# Patient Record
Sex: MALE | Race: WHITE | NOT HISPANIC OR LATINO | Employment: UNEMPLOYED | URBAN - NONMETROPOLITAN AREA
[De-identification: names, ages, dates, MRNs, and addresses within clinical notes are randomized per-mention and may not be internally consistent; named-entity substitution may affect disease eponyms.]

---

## 2020-02-10 ENCOUNTER — APPOINTMENT (OUTPATIENT)
Dept: GENERAL RADIOLOGY | Facility: HOSPITAL | Age: 45
End: 2020-02-10

## 2020-02-10 ENCOUNTER — HOSPITAL ENCOUNTER (EMERGENCY)
Facility: HOSPITAL | Age: 45
Discharge: HOME | End: 2020-02-10
Attending: EMERGENCY MEDICINE | Admitting: EMERGENCY MEDICINE

## 2020-02-10 VITALS
DIASTOLIC BLOOD PRESSURE: 87 MMHG | HEIGHT: 68 IN | WEIGHT: 176 LBS | SYSTOLIC BLOOD PRESSURE: 135 MMHG | TEMPERATURE: 98.4 F | HEART RATE: 79 BPM | OXYGEN SATURATION: 97 % | BODY MASS INDEX: 26.67 KG/M2 | RESPIRATION RATE: 18 BRPM

## 2020-02-10 DIAGNOSIS — Z90.81 HISTORY OF SPLENECTOMY: ICD-10-CM

## 2020-02-10 DIAGNOSIS — R07.9 CHEST PAIN, UNSPECIFIED TYPE: ICD-10-CM

## 2020-02-10 DIAGNOSIS — H66.90 ACUTE OTITIS MEDIA, UNSPECIFIED OTITIS MEDIA TYPE: Primary | ICD-10-CM

## 2020-02-10 LAB
ALBUMIN SERPL-MCNC: 3.6 G/DL (ref 3.5–5.2)
ALBUMIN/GLOB SERPL: 1.1 G/DL
ALP SERPL-CCNC: 70 U/L (ref 39–117)
ALT SERPL W P-5'-P-CCNC: 25 U/L (ref 1–41)
ANION GAP SERPL CALCULATED.3IONS-SCNC: 13.2 MMOL/L (ref 5–15)
AST SERPL-CCNC: 15 U/L (ref 1–40)
BASOPHILS # BLD AUTO: 0.41 10*3/MM3 (ref 0–0.2)
BASOPHILS NFR BLD AUTO: 2.6 % (ref 0–1.5)
BILIRUB SERPL-MCNC: 0.4 MG/DL (ref 0.2–1.2)
BUN BLD-MCNC: 14 MG/DL (ref 6–20)
BUN/CREAT SERPL: 14.9 (ref 7–25)
CALCIUM SPEC-SCNC: 9.3 MG/DL (ref 8.6–10.5)
CHLORIDE SERPL-SCNC: 111 MMOL/L (ref 98–107)
CO2 SERPL-SCNC: 22.8 MMOL/L (ref 22–29)
CREAT BLD-MCNC: 0.94 MG/DL (ref 0.76–1.27)
DEPRECATED RDW RBC AUTO: 48.9 FL (ref 37–54)
EOSINOPHIL # BLD AUTO: 0.53 10*3/MM3 (ref 0–0.4)
EOSINOPHIL NFR BLD AUTO: 3.4 % (ref 0.3–6.2)
ERYTHROCYTE [DISTWIDTH] IN BLOOD BY AUTOMATED COUNT: 14.7 % (ref 12.3–15.4)
FLUAV AG NPH QL: NEGATIVE
FLUBV AG NPH QL IA: NEGATIVE
GFR SERPL CREATININE-BSD FRML MDRD: 87 ML/MIN/1.73
GLOBULIN UR ELPH-MCNC: 3.2 GM/DL
GLUCOSE BLD-MCNC: 166 MG/DL (ref 65–99)
HCT VFR BLD AUTO: 44.2 % (ref 37.5–51)
HGB BLD-MCNC: 15.1 G/DL (ref 13–17.7)
HOLD SPECIMEN: NORMAL
HOLD SPECIMEN: NORMAL
IMM GRANULOCYTES # BLD AUTO: 0.06 10*3/MM3 (ref 0–0.05)
IMM GRANULOCYTES NFR BLD AUTO: 0.4 % (ref 0–0.5)
LYMPHOCYTES # BLD AUTO: 2.91 10*3/MM3 (ref 0.7–3.1)
LYMPHOCYTES NFR BLD AUTO: 18.5 % (ref 19.6–45.3)
MCH RBC QN AUTO: 30.8 PG (ref 26.6–33)
MCHC RBC AUTO-ENTMCNC: 34.2 G/DL (ref 31.5–35.7)
MCV RBC AUTO: 90.2 FL (ref 79–97)
MONOCYTES # BLD AUTO: 1.75 10*3/MM3 (ref 0.1–0.9)
MONOCYTES NFR BLD AUTO: 11.1 % (ref 5–12)
NEUTROPHILS # BLD AUTO: 10.1 10*3/MM3 (ref 1.7–7)
NEUTROPHILS NFR BLD AUTO: 64 % (ref 42.7–76)
NRBC BLD AUTO-RTO: 0 /100 WBC (ref 0–0.2)
PLATELET # BLD AUTO: 532 10*3/MM3 (ref 140–450)
PMV BLD AUTO: 9 FL (ref 6–12)
POTASSIUM BLD-SCNC: 3.9 MMOL/L (ref 3.5–5.2)
POTASSIUM BLD-SCNC: 5.8 MMOL/L (ref 3.5–5.2)
PROT SERPL-MCNC: 6.8 G/DL (ref 6–8.5)
RBC # BLD AUTO: 4.9 10*6/MM3 (ref 4.14–5.8)
SODIUM BLD-SCNC: 147 MMOL/L (ref 136–145)
TROPONIN I SERPL-MCNC: 0 NG/ML (ref 0–0.05)
TROPONIN T SERPL-MCNC: <0.01 NG/ML (ref 0–0.03)
TROPONIN T SERPL-MCNC: <0.01 NG/ML (ref 0–0.03)
WBC NRBC COR # BLD: 15.76 10*3/MM3 (ref 3.4–10.8)
WHOLE BLOOD HOLD SPECIMEN: NORMAL
WHOLE BLOOD HOLD SPECIMEN: NORMAL

## 2020-02-10 PROCEDURE — 80053 COMPREHEN METABOLIC PANEL: CPT | Performed by: EMERGENCY MEDICINE

## 2020-02-10 PROCEDURE — 99283 EMERGENCY DEPT VISIT LOW MDM: CPT

## 2020-02-10 PROCEDURE — 93005 ELECTROCARDIOGRAM TRACING: CPT | Performed by: EMERGENCY MEDICINE

## 2020-02-10 PROCEDURE — 84484 ASSAY OF TROPONIN QUANT: CPT | Performed by: EMERGENCY MEDICINE

## 2020-02-10 PROCEDURE — 85025 COMPLETE CBC W/AUTO DIFF WBC: CPT | Performed by: EMERGENCY MEDICINE

## 2020-02-10 PROCEDURE — 84132 ASSAY OF SERUM POTASSIUM: CPT | Performed by: EMERGENCY MEDICINE

## 2020-02-10 PROCEDURE — 71045 X-RAY EXAM CHEST 1 VIEW: CPT

## 2020-02-10 PROCEDURE — 87804 INFLUENZA ASSAY W/OPTIC: CPT | Performed by: EMERGENCY MEDICINE

## 2020-02-10 RX ORDER — ASPIRIN 325 MG
325 TABLET ORAL ONCE
Status: COMPLETED | OUTPATIENT
Start: 2020-02-10 | End: 2020-02-10

## 2020-02-10 RX ORDER — SODIUM CHLORIDE 0.9 % (FLUSH) 0.9 %
10 SYRINGE (ML) INJECTION AS NEEDED
Status: DISCONTINUED | OUTPATIENT
Start: 2020-02-10 | End: 2020-02-10 | Stop reason: HOSPADM

## 2020-02-10 RX ORDER — AMOXICILLIN 500 MG/1
500 CAPSULE ORAL ONCE
Status: COMPLETED | OUTPATIENT
Start: 2020-02-10 | End: 2020-02-10

## 2020-02-10 RX ORDER — AMOXICILLIN 875 MG/1
875 TABLET, COATED ORAL 2 TIMES DAILY
Qty: 14 TABLET | Refills: 0 | Status: ON HOLD | OUTPATIENT
Start: 2020-02-10 | End: 2022-05-02

## 2020-02-10 RX ADMIN — AMOXICILLIN 500 MG: 500 CAPSULE ORAL at 10:45

## 2020-02-10 RX ADMIN — ASPIRIN 325 MG ORAL TABLET 325 MG: 325 PILL ORAL at 07:52

## 2020-02-10 NOTE — ED PROVIDER NOTES
Subjective   History of Present Illness    Chief Complaint: Chest pain earache sinus congestion  History of Present Illness: Upper respiratory symptoms for the past several days.  Reported right upper chest pain today.  Dry cough  Onset: Approximately 5 days of upper respiratory symptoms.  Chest pain today  Duration: Persistent  Exacerbating / Alleviating factors: None  Associated symptoms: None  Character: 45-year-old male states he is homeless, history of splenectomy.  Is concerned that he needs antibiotics because he cannot usually fight off infection    Nurses Notes reviewed and agree, including vitals, allergies, social history and prior medical history.     REVIEW OF SYSTEMS: All systems reviewed and not pertinent unless noted.    Positive for: Chest pain upper respiratory symptoms sinus congestion earache    Negative for: Fever, hemoptysis, syncope or rash  Review of Systems    Past Medical History:   Diagnosis Date   • Injury of back        No Known Allergies    Past Surgical History:   Procedure Laterality Date   • ANKLE SURGERY Bilateral    • BACK SURGERY     • SPLENECTOMY         History reviewed. No pertinent family history.    Social History     Socioeconomic History   • Marital status: Single     Spouse name: Not on file   • Number of children: Not on file   • Years of education: Not on file   • Highest education level: Not on file   Tobacco Use   • Smoking status: Current Every Day Smoker     Packs/day: 0.50     Types: Cigarettes   • Smokeless tobacco: Current User     Types: Snuff, Chew   Substance and Sexual Activity   • Alcohol use: Never     Frequency: Never   • Drug use: Not Currently     Types: Methamphetamines     Comment: quit x 3wks   • Sexual activity: Defer           Objective   Physical Exam      GENERAL APPEARANCE: Well developed, well nourished, in no acute distress.  VITAL SIGNS: per nursing, reviewed and noted  SKIN: no rashes, ulcerations or petechiae.  Head: Normocephalic,  atraumatic.   EYES: perrla. EOMI.  ENT: Right TM clear, posterior pharynx patent.  Left TM with bulging dullness and erythema.  Boggy turbinates  LUNGS:  normal breath sounds. No retractions.   CARDIOVASCULAR:  regular rate and rhythm, no murmurs.  Good Peripheral pulses.  ABDOMEN: Soft, nontender, normal bowel sounds. No hernia. No ascites.  MUSCULOSKELETAL:  No tenderness. Full ROM. Strength and tone normal.  NEUROLOGIC: Alert, oriented x 3. No gross deficits.   NECK: Supple, symmetric. No tenderness, no masses. Full ROM  Back: full rom, no paraspinal spasm. No CVA tenderness.   PSYCH: appropriate affect,  : no bladder tenderness or distention, no CVA tenderness      Procedures     No attending provider procedures were performed      ED Course  ED Course as of Feb 10 1159   Mon Feb 10, 2020   0828 EKG interpreted by me reveals sinus rhythm at a rate of 98.  And no ectopy no ischemic changes.    [PF]   1004 Troponin T: <0.010 [PF]   1004 Potassium: 3.9 [PF]   1004 Influenza A Ag, EIA: Negative [PF]   1004 Influenza B Ag, EIA: Negative [PF]   1004 Glucose(!): 166 [PF]   1004 BUN: 14 [PF]   1004 Creatinine: 0.94 [PF]   1004 Sodium(!): 147 [PF]   1004 Chloride(!): 111 [PF]   1004 CO2: 22.8 [PF]   1004 Calcium: 9.3 [PF]   1004 Albumin: 3.60 [PF]   1004 ALT (SGPT): 25 [PF]   1004 AST (SGOT): 15 [PF]   1004 Alkaline Phosphatase: 70 [PF]   1004 Total Bilirubin: 0.4 [PF]   1004 Troponin I: 0.000 [PF]   1004 Hemoglobin: 15.1 [PF]   1004 Hematocrit: 44.2 [PF]   1004 Platelets(!): 532 [PF]   1005 FINDINGS: The heart is normal in size. The mediastinum is unremarkable.  The lungs are clear. There is no pneumothorax.  There are no acute  osseous abnormalities.     IMPRESSION:  No acute cardiopulmonary process.    [PF]      ED Course User Index  [PF] Devonte Dumas W, DO                                               MDM    Reassuring cardiac work-up with troponins negative x2.  No evidence of ischemic changes on EKG.  Chest x-ray  without acute findings..  Discharged home and stable condition with outpatient follow-up recommendations and return precautions discussed.  We will add amoxicillin for otitis media.    Final diagnoses:   Acute otitis media, unspecified otitis media type   History of splenectomy   Chest pain, unspecified type            Devonte Dumas,   02/10/20 1158

## 2020-02-10 NOTE — ED NOTES
At this time, case management was contacted and left a voicemail per JANETT Hernández.      Chloe Santamaria  02/10/20 1037

## 2020-02-10 NOTE — ED NOTES
At this time, House supervisor was contacted and she states she will reached out to case management.      Chloe Santamaria  02/10/20 1037

## 2020-02-10 NOTE — ED NOTES
At this time, Case management was contacted and left a voicemail per JANETT Hernández.      Clhoe Santamaria  02/10/20 1013

## 2022-05-01 ENCOUNTER — HOSPITAL ENCOUNTER (EMERGENCY)
Facility: HOSPITAL | Age: 47
Discharge: PSYCHIATRIC OR SUBSTANCE USE FACILITY | End: 2022-05-02
Attending: FAMILY MEDICINE | Admitting: FAMILY MEDICINE

## 2022-05-01 DIAGNOSIS — R45.851 SUICIDAL IDEATIONS: ICD-10-CM

## 2022-05-01 DIAGNOSIS — F19.10 SUBSTANCE ABUSE: Primary | ICD-10-CM

## 2022-05-01 LAB
ALBUMIN SERPL-MCNC: 4.7 G/DL (ref 3.5–5.2)
ALBUMIN/GLOB SERPL: 1.3 G/DL
ALP SERPL-CCNC: 107 U/L (ref 39–117)
ALT SERPL W P-5'-P-CCNC: 38 U/L (ref 1–41)
AMPHET+METHAMPHET UR QL: POSITIVE
AMPHETAMINES UR QL: POSITIVE
ANION GAP SERPL CALCULATED.3IONS-SCNC: 15.3 MMOL/L (ref 5–15)
APAP SERPL-MCNC: <5 MCG/ML (ref 0–30)
AST SERPL-CCNC: 28 U/L (ref 1–40)
BACTERIA UR QL AUTO: ABNORMAL /HPF
BARBITURATES UR QL SCN: NEGATIVE
BASOPHILS # BLD AUTO: 0.28 10*3/MM3 (ref 0–0.2)
BASOPHILS NFR BLD AUTO: 1.5 % (ref 0–1.5)
BENZODIAZ UR QL SCN: NEGATIVE
BILIRUB SERPL-MCNC: 0.7 MG/DL (ref 0–1.2)
BILIRUB UR QL STRIP: NEGATIVE
BUN SERPL-MCNC: 23 MG/DL (ref 6–20)
BUN/CREAT SERPL: 21.3 (ref 7–25)
BUPRENORPHINE SERPL-MCNC: NEGATIVE NG/ML
CALCIUM SPEC-SCNC: 9.9 MG/DL (ref 8.6–10.5)
CANNABINOIDS SERPL QL: POSITIVE
CHLORIDE SERPL-SCNC: 100 MMOL/L (ref 98–107)
CLARITY UR: CLEAR
CO2 SERPL-SCNC: 22.7 MMOL/L (ref 22–29)
COCAINE UR QL: NEGATIVE
COLOR UR: YELLOW
CREAT SERPL-MCNC: 1.08 MG/DL (ref 0.76–1.27)
DEPRECATED RDW RBC AUTO: 47.6 FL (ref 37–54)
EGFRCR SERPLBLD CKD-EPI 2021: 85.2 ML/MIN/1.73
EOSINOPHIL # BLD AUTO: 0.03 10*3/MM3 (ref 0–0.4)
EOSINOPHIL NFR BLD AUTO: 0.2 % (ref 0.3–6.2)
ERYTHROCYTE [DISTWIDTH] IN BLOOD BY AUTOMATED COUNT: 14.3 % (ref 12.3–15.4)
ETHANOL BLD-MCNC: <10 MG/DL (ref 0–10)
ETHANOL UR QL: <0.01 %
GLOBULIN UR ELPH-MCNC: 3.6 GM/DL
GLUCOSE SERPL-MCNC: 99 MG/DL (ref 65–99)
GLUCOSE UR STRIP-MCNC: NEGATIVE MG/DL
HCT VFR BLD AUTO: 44.3 % (ref 37.5–51)
HGB BLD-MCNC: 15.4 G/DL (ref 13–17.7)
HGB UR QL STRIP.AUTO: NEGATIVE
HOLD SPECIMEN: NORMAL
HOLD SPECIMEN: NORMAL
HYALINE CASTS UR QL AUTO: ABNORMAL /LPF
IMM GRANULOCYTES # BLD AUTO: 0.07 10*3/MM3 (ref 0–0.05)
IMM GRANULOCYTES NFR BLD AUTO: 0.4 % (ref 0–0.5)
KETONES UR QL STRIP: ABNORMAL
LEUKOCYTE ESTERASE UR QL STRIP.AUTO: NEGATIVE
LYMPHOCYTES # BLD AUTO: 2.83 10*3/MM3 (ref 0.7–3.1)
LYMPHOCYTES NFR BLD AUTO: 15.6 % (ref 19.6–45.3)
MAGNESIUM SERPL-MCNC: 2.3 MG/DL (ref 1.6–2.6)
MCH RBC QN AUTO: 31.6 PG (ref 26.6–33)
MCHC RBC AUTO-ENTMCNC: 34.8 G/DL (ref 31.5–35.7)
MCV RBC AUTO: 90.8 FL (ref 79–97)
METHADONE UR QL SCN: NEGATIVE
MONOCYTES # BLD AUTO: 1.78 10*3/MM3 (ref 0.1–0.9)
MONOCYTES NFR BLD AUTO: 9.8 % (ref 5–12)
MUCOUS THREADS URNS QL MICRO: ABNORMAL /HPF
NEUTROPHILS NFR BLD AUTO: 13.18 10*3/MM3 (ref 1.7–7)
NEUTROPHILS NFR BLD AUTO: 72.5 % (ref 42.7–76)
NITRITE UR QL STRIP: NEGATIVE
NRBC BLD AUTO-RTO: 0 /100 WBC (ref 0–0.2)
OPIATES UR QL: POSITIVE
OXYCODONE UR QL SCN: NEGATIVE
PCP UR QL SCN: NEGATIVE
PH UR STRIP.AUTO: <=5 [PH] (ref 5–8)
PLATELET # BLD AUTO: 617 10*3/MM3 (ref 140–450)
PMV BLD AUTO: 9.4 FL (ref 6–12)
POTASSIUM SERPL-SCNC: 4.1 MMOL/L (ref 3.5–5.2)
PROPOXYPH UR QL: NEGATIVE
PROT SERPL-MCNC: 8.3 G/DL (ref 6–8.5)
PROT UR QL STRIP: ABNORMAL
RBC # BLD AUTO: 4.88 10*6/MM3 (ref 4.14–5.8)
RBC # UR STRIP: ABNORMAL /HPF
REF LAB TEST METHOD: ABNORMAL
SALICYLATES SERPL-MCNC: <0.3 MG/DL
SARS-COV-2 RNA PNL SPEC NAA+PROBE: NOT DETECTED
SODIUM SERPL-SCNC: 138 MMOL/L (ref 136–145)
SP GR UR STRIP: >=1.03 (ref 1–1.03)
SPERM URNS QL MICRO: ABNORMAL /HPF
SQUAMOUS #/AREA URNS HPF: ABNORMAL /HPF
TRICYCLICS UR QL SCN: NEGATIVE
UROBILINOGEN UR QL STRIP: ABNORMAL
WBC # UR STRIP: ABNORMAL /HPF
WBC NRBC COR # BLD: 18.17 10*3/MM3 (ref 3.4–10.8)
WHOLE BLOOD HOLD SPECIMEN: NORMAL
WHOLE BLOOD HOLD SPECIMEN: NORMAL

## 2022-05-01 PROCEDURE — 85025 COMPLETE CBC W/AUTO DIFF WBC: CPT | Performed by: PHYSICIAN ASSISTANT

## 2022-05-01 PROCEDURE — 81001 URINALYSIS AUTO W/SCOPE: CPT | Performed by: PHYSICIAN ASSISTANT

## 2022-05-01 PROCEDURE — 82077 ASSAY SPEC XCP UR&BREATH IA: CPT | Performed by: PHYSICIAN ASSISTANT

## 2022-05-01 PROCEDURE — 80143 DRUG ASSAY ACETAMINOPHEN: CPT | Performed by: PHYSICIAN ASSISTANT

## 2022-05-01 PROCEDURE — 80053 COMPREHEN METABOLIC PANEL: CPT | Performed by: PHYSICIAN ASSISTANT

## 2022-05-01 PROCEDURE — 36415 COLL VENOUS BLD VENIPUNCTURE: CPT

## 2022-05-01 PROCEDURE — 83735 ASSAY OF MAGNESIUM: CPT | Performed by: PHYSICIAN ASSISTANT

## 2022-05-01 PROCEDURE — C9803 HOPD COVID-19 SPEC COLLECT: HCPCS

## 2022-05-01 PROCEDURE — 93005 ELECTROCARDIOGRAM TRACING: CPT

## 2022-05-01 PROCEDURE — 80306 DRUG TEST PRSMV INSTRMNT: CPT | Performed by: PHYSICIAN ASSISTANT

## 2022-05-01 PROCEDURE — 99284 EMERGENCY DEPT VISIT MOD MDM: CPT

## 2022-05-01 PROCEDURE — 80179 DRUG ASSAY SALICYLATE: CPT | Performed by: PHYSICIAN ASSISTANT

## 2022-05-01 PROCEDURE — 87635 SARS-COV-2 COVID-19 AMP PRB: CPT | Performed by: PHYSICIAN ASSISTANT

## 2022-05-02 ENCOUNTER — HOSPITAL ENCOUNTER (INPATIENT)
Facility: HOSPITAL | Age: 47
LOS: 8 days | Discharge: HOME | End: 2022-05-10
Attending: PSYCHIATRY & NEUROLOGY | Admitting: PSYCHIATRY & NEUROLOGY

## 2022-05-02 VITALS
RESPIRATION RATE: 16 BRPM | TEMPERATURE: 98.1 F | OXYGEN SATURATION: 99 % | HEART RATE: 72 BPM | SYSTOLIC BLOOD PRESSURE: 110 MMHG | HEIGHT: 68 IN | DIASTOLIC BLOOD PRESSURE: 82 MMHG | WEIGHT: 165 LBS | BODY MASS INDEX: 25.01 KG/M2

## 2022-05-02 PROBLEM — R45.851 SUICIDAL IDEATION: Status: ACTIVE | Noted: 2022-05-02

## 2022-05-02 PROCEDURE — 93010 ELECTROCARDIOGRAM REPORT: CPT | Performed by: INTERNAL MEDICINE

## 2022-05-02 PROCEDURE — 93005 ELECTROCARDIOGRAM TRACING: CPT | Performed by: PSYCHIATRY & NEUROLOGY

## 2022-05-02 PROCEDURE — 99223 1ST HOSP IP/OBS HIGH 75: CPT | Performed by: PSYCHIATRY & NEUROLOGY

## 2022-05-02 RX ORDER — DICYCLOMINE HYDROCHLORIDE 10 MG/1
10 CAPSULE ORAL 3 TIMES DAILY PRN
Status: DISPENSED | OUTPATIENT
Start: 2022-05-02 | End: 2022-05-07

## 2022-05-02 RX ORDER — ALUMINA, MAGNESIA, AND SIMETHICONE 2400; 2400; 240 MG/30ML; MG/30ML; MG/30ML
15 SUSPENSION ORAL EVERY 6 HOURS PRN
Status: DISCONTINUED | OUTPATIENT
Start: 2022-05-02 | End: 2022-05-10 | Stop reason: HOSPADM

## 2022-05-02 RX ORDER — ONDANSETRON 4 MG/1
4 TABLET, FILM COATED ORAL EVERY 6 HOURS PRN
Status: DISCONTINUED | OUTPATIENT
Start: 2022-05-02 | End: 2022-05-10 | Stop reason: HOSPADM

## 2022-05-02 RX ORDER — CLONIDINE HYDROCHLORIDE 0.1 MG/1
0.1 TABLET ORAL 4 TIMES DAILY PRN
Status: ACTIVE | OUTPATIENT
Start: 2022-05-03 | End: 2022-05-04

## 2022-05-02 RX ORDER — IBUPROFEN 400 MG/1
400 TABLET ORAL EVERY 6 HOURS PRN
Status: DISCONTINUED | OUTPATIENT
Start: 2022-05-02 | End: 2022-05-10 | Stop reason: HOSPADM

## 2022-05-02 RX ORDER — HYDROXYZINE 50 MG/1
50 TABLET, FILM COATED ORAL EVERY 6 HOURS PRN
Status: DISCONTINUED | OUTPATIENT
Start: 2022-05-02 | End: 2022-05-10 | Stop reason: HOSPADM

## 2022-05-02 RX ORDER — ACETAMINOPHEN 325 MG/1
650 TABLET ORAL EVERY 6 HOURS PRN
Status: DISCONTINUED | OUTPATIENT
Start: 2022-05-02 | End: 2022-05-10 | Stop reason: HOSPADM

## 2022-05-02 RX ORDER — NITROFURANTOIN 25; 75 MG/1; MG/1
100 CAPSULE ORAL 2 TIMES DAILY
Status: DISPENSED | OUTPATIENT
Start: 2022-05-02 | End: 2022-05-09

## 2022-05-02 RX ORDER — FAMOTIDINE 20 MG/1
20 TABLET, FILM COATED ORAL 2 TIMES DAILY PRN
Status: DISCONTINUED | OUTPATIENT
Start: 2022-05-02 | End: 2022-05-10 | Stop reason: HOSPADM

## 2022-05-02 RX ORDER — BENZONATATE 100 MG/1
100 CAPSULE ORAL 3 TIMES DAILY PRN
Status: DISCONTINUED | OUTPATIENT
Start: 2022-05-02 | End: 2022-05-10 | Stop reason: HOSPADM

## 2022-05-02 RX ORDER — CYCLOBENZAPRINE HCL 10 MG
10 TABLET ORAL 3 TIMES DAILY PRN
Status: DISPENSED | OUTPATIENT
Start: 2022-05-02 | End: 2022-05-07

## 2022-05-02 RX ORDER — BENZTROPINE MESYLATE 1 MG/ML
1 INJECTION INTRAMUSCULAR; INTRAVENOUS ONCE AS NEEDED
Status: DISCONTINUED | OUTPATIENT
Start: 2022-05-02 | End: 2022-05-10 | Stop reason: HOSPADM

## 2022-05-02 RX ORDER — NICOTINE 21 MG/24HR
1 PATCH, TRANSDERMAL 24 HOURS TRANSDERMAL
Status: DISCONTINUED | OUTPATIENT
Start: 2022-05-02 | End: 2022-05-10 | Stop reason: HOSPADM

## 2022-05-02 RX ORDER — BENZTROPINE MESYLATE 1 MG/1
2 TABLET ORAL ONCE AS NEEDED
Status: DISCONTINUED | OUTPATIENT
Start: 2022-05-02 | End: 2022-05-10 | Stop reason: HOSPADM

## 2022-05-02 RX ORDER — ECHINACEA PURPUREA EXTRACT 125 MG
2 TABLET ORAL AS NEEDED
Status: DISCONTINUED | OUTPATIENT
Start: 2022-05-02 | End: 2022-05-10 | Stop reason: HOSPADM

## 2022-05-02 RX ORDER — LOPERAMIDE HYDROCHLORIDE 2 MG/1
2 CAPSULE ORAL
Status: DISCONTINUED | OUTPATIENT
Start: 2022-05-02 | End: 2022-05-10 | Stop reason: HOSPADM

## 2022-05-02 RX ORDER — ESCITALOPRAM OXALATE 10 MG/1
5 TABLET ORAL NIGHTLY
Status: DISCONTINUED | OUTPATIENT
Start: 2022-05-02 | End: 2022-05-04

## 2022-05-02 RX ORDER — CLONIDINE HYDROCHLORIDE 0.1 MG/1
0.1 TABLET ORAL DAILY PRN
Status: ACTIVE | OUTPATIENT
Start: 2022-05-06 | End: 2022-05-07

## 2022-05-02 RX ORDER — CLONIDINE HYDROCHLORIDE 0.1 MG/1
0.1 TABLET ORAL 4 TIMES DAILY PRN
Status: ACTIVE | OUTPATIENT
Start: 2022-05-02 | End: 2022-05-03

## 2022-05-02 RX ORDER — TRAZODONE HYDROCHLORIDE 50 MG/1
50 TABLET ORAL NIGHTLY PRN
Status: DISCONTINUED | OUTPATIENT
Start: 2022-05-02 | End: 2022-05-10 | Stop reason: HOSPADM

## 2022-05-02 RX ORDER — CLONIDINE HYDROCHLORIDE 0.1 MG/1
0.1 TABLET ORAL 3 TIMES DAILY PRN
Status: ACTIVE | OUTPATIENT
Start: 2022-05-04 | End: 2022-05-05

## 2022-05-02 RX ORDER — CLONIDINE HYDROCHLORIDE 0.1 MG/1
0.1 TABLET ORAL 2 TIMES DAILY PRN
Status: ACTIVE | OUTPATIENT
Start: 2022-05-05 | End: 2022-05-06

## 2022-05-02 RX ADMIN — CYCLOBENZAPRINE 10 MG: 10 TABLET, FILM COATED ORAL at 20:34

## 2022-05-02 RX ADMIN — ESCITALOPRAM 5 MG: 10 TABLET, FILM COATED ORAL at 20:34

## 2022-05-02 RX ADMIN — TRAZODONE HYDROCHLORIDE 50 MG: 50 TABLET ORAL at 20:34

## 2022-05-02 RX ADMIN — NITROFURANTOIN MONOHYDRATE/MACROCRYSTALLINE 100 MG: 25; 75 CAPSULE ORAL at 20:34
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
## 2022-05-02 NOTE — PLAN OF CARE
Problem: Adult Behavioral Health Plan of Care  Goal: Plan of Care Review  Outcome: Ongoing, Progressing  Flowsheets  Taken 5/2/2022 1359 by Jovanni Guerrero, RN  Progress: improving  Outcome Evaluation: PATIENTS MOOD IS LABILE, FREQUENTLY ANGRY AND PARANOID. PATIENT VERBALIZES SEVERE ANXIETY AND DEPRESSIONSI WITH NO PLAN THAT HE WILL STATE, AVH, NO CRAVING OR S/S OF WITHDRAWAL. NEW ORDERS : CBC, LEXAPRO 5MG.  Taken 5/2/2022 0951 by Clare Hill MSW  Plan of Care Reviewed With: patient  Patient Agreement with Plan of Care: agrees  Taken 5/2/2022 0746 by Jovanni Guerrero, RN  Plan of Care Reviewed With: patient  Patient Agreement with Plan of Care: agrees   Goal Outcome Evaluation:  Plan of Care Reviewed With: patient  Patient Agreement with Plan of Care: agrees     Progress: improving  Outcome Evaluation: PATIENTS MOOD IS LABILE, FREQUENTLY ANGRY AND PARANOID. PATIENT VERBALIZES SEVERE ANXIETY AND DEPRESSIONSI WITH NO PLAN THAT HE WILL STATE, AVH, NO CRAVING OR S/S OF WITHDRAWAL. NEW ORDERS : CBC, LEXAPRO 5MG.

## 2022-05-02 NOTE — H&P
INITIAL PSYCHIATRIC HISTORY & PHYSICAL    Patient Identification:  Name:  Gera Stern  Age:  47 y.o.  Sex:  male  :  1975  MRN:  [id number]   Visit Number:  [id number]  Primary Care Physician:  Provider, No Known    SUBJECTIVE    CC/Focus of Exam: suicidal    HPI: Gera Stern is a 47 y.o. male who was admitted on 2022 and evaluated on 2022  with complaints of suicidal. Patient states he has been having thoughts of killing himself and would do so by shooting himself. Patient states  that for 10 years he used to take a large amount of pills and binge drink in order to possibly overdose. Patient states that he see's shadows and hears voices. Patient states that the voices talk to him.  Patient states that he uses meth, Lortab,  and smokes marijuana. Patient states that he drinks alcohol. Patient denies any tobacco use. Patient states music as a stressor in his life. Patient denies any history of physical, mental or sexual abuse. Patient rates his appetite as good. Patient rates his sleep as good. Patient states that he has nightmares. Patient denies any anxiety. Patient rates his depression on a scale of 1/10 with 10 being the most severe a 10. Patient denies any cravings. Patient's COWS was 13. Patient states that he has suicidal ideation. Patient denies any homicidal ideation. Patient states that he has hallucinations.  Patient was admitted to [facility] for further safety and stabilization.  He endorses depressed mood, low energy, decreased motivation.    PAST PSYCHIATRIC HX: Patient has had no prior admissions. Patient denies any outpatient care at this time.    SUBSTANCE USE HX: UDS was positive for Amphetamine, Opiate, Methamphetamine, THC. See HPI for current use.     SOCIAL HX: Patient states that he was born in [place], [place]. Patient states that he was raised in [place], [place]. Patient states that he is currently homeless. Patient states that he is currently  unemployed. Patient states that he has a highschool diploma. Patient denies any legal issues.     Past Medical History:   Diagnosis Date   • Injury of back           Past Surgical History:   Procedure Laterality Date   • ANKLE SURGERY Bilateral    • BACK SURGERY     • SPLENECTOMY         History reviewed. No pertinent family history.      Medications Prior to Admission   Medication Sig Dispense Refill Last Dose   • amoxicillin (AMOXIL) 875 MG tablet Take 1 tablet by mouth 2 (Two) Times a Day. 14 tablet 0 5/2/2022 at Unknown time         ALLERGIES:  Bactrim [sulfamethoxazole-trimethoprim]    Temp:  [97.8 °F (36.6 °C)-98.3 °F (36.8 °C)] 97.8 °F (36.6 °C)  Heart Rate:  [] 83  Resp:  [16-22] 18  BP: (110-152)/() 117/81    REVIEW OF SYSTEMS:  Review of Systems   Constitutional: Positive for activity change and appetite change.   HENT: Negative.    Eyes: Negative.    Respiratory: Negative.    Cardiovascular: Negative.    Gastrointestinal: Negative.    Endocrine: Negative.    Genitourinary: Negative.    Musculoskeletal: Negative.    Skin: Negative.    Allergic/Immunologic: Negative.    Neurological: Negative.    Hematological: Negative.    All other systems reviewed and are negative.       OBJECTIVE    PHYSICAL EXAM:  Physical Exam  Constitutional:       Appearance: He is well-developed.   HENT:      Head: Normocephalic and atraumatic.      Right Ear: External ear normal.      Nose: Nose normal.   Eyes:      General: No scleral icterus.        Right eye: No discharge.         Left eye: No discharge.      Conjunctiva/sclera: Conjunctivae normal.      Pupils: Pupils are equal, round, and reactive to light.   Neck:      Thyroid: No thyromegaly.      Vascular: No JVD.      Trachea: No tracheal deviation.   Cardiovascular:      Rate and Rhythm: Normal rate and regular rhythm.      Heart sounds: Normal heart sounds. No murmur heard.    No friction rub. No gallop.   Pulmonary:      Effort: Pulmonary effort is normal.  No respiratory distress.      Breath sounds: Normal breath sounds. No stridor. No wheezing or rales.   Abdominal:      General: Bowel sounds are normal. There is no distension.      Palpations: Abdomen is soft. There is no mass.      Tenderness: There is no abdominal tenderness. There is no guarding or rebound.   Musculoskeletal:         General: No tenderness or deformity. Normal range of motion.   Lymphadenopathy:      Cervical: No cervical adenopathy.   Skin:     General: Skin is warm and dry.      Findings: No erythema or rash.   Neurological:      Mental Status: He is alert and oriented to person, place, and time.      Cranial Nerves: No cranial nerve deficit.      Motor: No abnormal muscle tone.      Deep Tendon Reflexes: Reflexes normal.         MENTAL STATUS EXAM:    Hygiene:   fair  Cooperation:  Evasive  Eye Contact:  Closed  Psychomotor Behavior:  Aggitated  Affect:  Appropriate  Hopelessness: 4  Speech:  Minimal  Linear  Thought Content:  Normal  Suicidal:  Suicidal Ideation  Homicidal:  None  Hallucinations:  Auditory and Visual  Delusion:  None  Memory:  Intact  Orientation:  Person, Place, Time and Situation  Reliability:  fair  Insight:  Fair  Judgement:  Poor  Impulse Control:  Poor      Imaging Results (Last 24 Hours)     ** No results found for the last 24 hours. **           ECG/EMG Results (most recent)     None           Lab Results   Component Value Date    GLUCOSE 99 05/01/2022    BUN 23 (H) 05/01/2022    CREATININE 1.08 05/01/2022    EGFRIFNONA 87 02/10/2020    BCR 21.3 05/01/2022    CO2 22.7 05/01/2022    CALCIUM 9.9 05/01/2022    ALBUMIN 4.70 05/01/2022    AST 28 05/01/2022    ALT 38 05/01/2022       Lab Results   Component Value Date    WBC 18.17 (H) 05/01/2022    HGB 15.4 05/01/2022    HCT 44.3 05/01/2022    MCV 90.8 05/01/2022     (H) 05/01/2022       Last Urine Toxicity     LAST URINE TOXICITY RESULTS Latest Ref Rng & Units 5/1/2022    AMPHETAMINES SCREEN, URINE Negative  Positive(A)    BARBITURATES SCREEN Negative Negative    BENZODIAZEPINE SCREEN, URINE Negative Negative    BUPRENORPHINEUR Negative Negative    COCAINE SCREEN, URINE Negative Negative    METHADONE SCREEN, URINE Negative Negative    METHAMPHETAMINEUR Negative Positive(A)          Brief Urine Lab Results  (Last result in the past 365 days)      Color   Clarity   Blood   Leuk Est   Nitrite   Protein   CREAT   Urine HCG        05/01/22 2127 Yellow   Clear   Negative   Negative   Negative   30 mg/dL (1+)                     ASSESSMENT & PLAN:        Suicidal ideation  Unspecified Depressive Disorder  Psychosis    Given the high risk and/or potentially life-threatening nature of patient's presenting symptoms, patient has been admitted for safety and stabilization and placed on the appropriate level of precautions.  Patient will be assigned a Masters level therapist and encouraged to participate in both individual and group therapy on the unit.  Routine labs have been ordered.    CODE STATUS: Full    Start trial of lexapro 5mg QHS  Consider trial of atypical antipsychotic    Amphetamine, Opiate, and Cannabis Use Disorder, Moderate  - Patient denies heavy use, stating that he does not think that he is at risk for withdrawal.  Denies withdrawal symptoms at this time.  - Will monitor for withdrawals and treat accordingly    Elevated WBC, elevated platelet count  - May be due to dehydration  - Will recheck in AM    Further treatment planning as per course.    I, Danis Del Real MD, personally performed the services described in this documentation as scribed by the below named individual and is both accurate and complete.                This note was generated using a scribe, Jayla Ponce.  The work documented in this note was completed, reviewed, and approved by the attending psychiatrist as designated Dr.Brian Del Real electronic signature.

## 2022-05-02 NOTE — PLAN OF CARE
Goal Outcome Evaluation:  Plan of Care Reviewed With: patient  Patient Agreement with Plan of Care: agrees  Consent Given to Review Plan with: Declines family involvement.  Progress: improving  Outcome Evaluation: Therapist met with Patient to review care plan, social history, aftercare recommendations, and disposition plans; Patient agreeable.    Problem: Adult Behavioral Health Plan of Care  Goal: Plan of Care Review  Outcome: Ongoing, Progressing  Flowsheets (Taken 5/2/2022 0951)  Consent Given to Review Plan with: Declines family involvement.  Progress: improving  Plan of Care Reviewed With: patient  Patient Agreement with Plan of Care: agrees  Outcome Evaluation:  • Therapist met with Patient to review care plan, social history, aftercare recommendations, and disposition plans  • Patient agreeable.     Problem: Adult Behavioral Health Plan of Care  Goal: Patient-Specific Goal (Individualization)  Outcome: Ongoing, Progressing  Flowsheets  Taken 5/2/2022 0951  Patient-Specific Goals (Include Timeframe): Patient will identify 2-3 healthy coping skills, complete safety plan, complete aftercare plan, and deny SI/HI prior to discharge.  Individualized Care Needs: Therapist will offer 1-4 therapy sessions, safety planning, aftercare planning, family education, daily groups, and brief CBT/MI interventions.  Anxieties, Fears or Concerns: None voiced.  Taken 5/2/2022 0944  Patient Personal Strengths:  • expressive of emotions  • expressive of needs  • tolerant  • resilient  • self-reliant  Patient Vulnerabilities:  • poor impulse control  • lacks insight into illness  • adverse childhood experience(s)  • family/relationship conflict  • substance abuse/addiction  • history of unsuccessful treatment  • housing insecurity  • occupational insecurity     Problem: Adult Behavioral Health Plan of Care  Goal: Optimized Coping Skills in Response to Life Stressors  Outcome: Ongoing, Progressing  Flowsheets (Taken 5/2/2022  0951)  Optimized Coping Skills in Response to Life Stressors: making progress toward outcome  Intervention: Promote Effective Coping Strategies  Flowsheets (Taken 5/2/2022 0951)  Supportive Measures:  • active listening utilized  • decision-making supported  • positive reinforcement provided  • verbalization of feelings encouraged     Problem: Adult Behavioral Health Plan of Care  Goal: Develops/Participates in Therapeutic [place] to Support Successful Transition  Outcome: Ongoing, Progressing  Flowsheets (Taken 5/2/2022 0951)  Develops/Participates in Therapeutic [place] to Support Successful Transition: making progress toward outcome  Intervention: Foster Therapeutic [place]  Flowsheets (Taken 5/2/2022 0951)  Trust Relationship/Rapport:  • care explained  • questions encouraged  • choices provided  • emotional support provided  • thoughts/feelings acknowledged  • reassurance provided  • empathic listening provided  • questions answered  Intervention: Mutually Develop Transition Plan  Flowsheets (Taken 5/2/2022 0951)  Outpatient/Agency/Support Group Needs:  • outpatient counseling  • outpatient medication management  • outpatient psychiatric care (specify)  • intensive outpatient services  • residential services  • outpatient substance abuse treatment (specify)  Discharge Coordination/Progress:  • Therapist met with Patient to complete a discharge needs assessment  • Patient agreeable.  Transition Support:  • community resources reviewed  • follow-up care coordinated  • crisis management plan promoted  • follow-up care discussed  • crisis management plan verbalized  Transportation Anticipated: public transportation  Anticipated Discharge Disposition:  • residential substance use unit  • homeless  • homeless shelter  Transportation Concerns: no car  Current Discharge Risk:  • substance use/abuse  • psychiatric illness  • homeless  Concerns to be Addressed:  • suicidal  • substance/tobacco abuse/use  •  homelessness  • adjustment to diagnosis/illness  • coping/stress  • mental health  • medication  • compliance issue  Readmission Within the Last 30 Days: no previous admission in last 30 days  Patient/Family Anticipated Services at Transition:  • outpatient care  • rehabilitation services  • mental health services  Patient/Family Anticipates Transition to: (Unknown at this time.) other (see comments)     DATA: Therapist met individually with Patient this date for initial evaluation.  Introduced self as Therapist and the role of a positive therapeutic relationship; Patient agreeable.      Therapist encouraged Patient to speak openly and honestly about any issues or stressors during treatment stay. Therapist explained how open communication is significant to providing most effective care.      Therapist completed psychosocial assessment, integrated summary, reviewed care plans, disposition planning and discussed hospitalization expectations and treatment goals this date.     Therapist provided education regarding different levels of care and is recommending residential treatment for most appropriate aftercare. Patient was not agreeable to any aftercare on this date and was not interested in discussing options.    Therapist is recommending family involvement prior to discharge and it's importance. Patient declines family involvement.     Therapist staffed case with nursing who reports Patient has been irritable and uncooperative with them today as well.     CLINICAL MANUVERING/INTERVENTIONS:  Assisted Patient in processing session content; acknowledged and normalized Patient’s thoughts, feelings, and concerns by utilizing a person-centered approach in efforts to build appropriate rapport and a positive therapeutic relationship with open and honest communication. Allowed Patient to ventilate regarding current stressors and triggers for negative emotions and thoughts in a safe nonjudgmental environment with unconditional  "positive regard, active listening skills, and empathy.     ASSESSMENT: Gera Stern is a 47 year old , single, homeless male who presented to the ED from [facility] reporting SI. Patient was agitated and cussed this Therapist several times during assessment. He states \"everyone is shutting doors in my face and acting like they don't know me.\" Patient states he is homeless and has nowhere to go when he leaves here. Therapist recommended rehab and Patient became very irritable and stated he wants to stay here for awhile and get \"mental help.\" Patient reports doing \"every drug known to man.\" He reports having a traumatic childhood and no longer speaks to his family. Patient became very angry, began cussing, and was unable to reason with.     PLAN: Patient will receive 24/7 nursing monitoring and daily psychiatrist evaluation by a multidisciplinary team.    Patient will continue stabilization at this time.     Patient was not agreeable to any aftercare on this date. No consents signed.     Public assistance with transportation will not be needed.  "

## 2022-05-02 NOTE — ED PROVIDER NOTES
Subjective   Chief Complaint: Suicidal ideations, wants to be kept away from methamphetamine  History of Present Illness: 47-year-old male comes in for evaluation above complaint.  He states he has been having increasing suicidal thoughts recently and also has a history of methamphetamine abuse and wants to be helped with this.  He denies a plan but does state he knows of several ways he could kill himself.  He states he was recently in a rehabilitation facility in [facility] for methamphetamine abuse and he wants to go back inpatient.  He denies any complaints of physical pain today other than chronic low back pain.  No other drugs other than methamphetamine.  He denies any IV drugs only snorting and smoking methamphetamine.  Onset: Ongoing for past several days  Timing: Ongoing  Exacerbating / Alleviating factors: None  Associated symptoms: None      Nurses Notes reviewed and agree, including vitals, allergies, social history and prior medical history.          Review of Systems   Constitutional: Negative.    HENT: Negative.    Eyes: Negative.    Respiratory: Negative.    Cardiovascular: Negative.    Gastrointestinal: Negative.    Genitourinary: Negative.    Musculoskeletal: Negative.    Skin: Negative.    Allergic/Immunologic: Negative.    Neurological: Negative.    Psychiatric/Behavioral: Positive for suicidal ideas.   All other systems reviewed and are negative.      Past Medical History:   Diagnosis Date   • Injury of back        Allergies   Allergen Reactions   • Bactrim [Sulfamethoxazole-Trimethoprim] Swelling       Past Surgical History:   Procedure Laterality Date   • ANKLE SURGERY Bilateral    • BACK SURGERY     • SPLENECTOMY         History reviewed. No pertinent family history.    Social History     Socioeconomic History   • Marital status: Single   Tobacco Use   • Smoking status: Current Every Day Smoker     Packs/day: 0.50     Types: Cigarettes   • Smokeless tobacco: Current User     Types:  Snuff, Chew   Substance and Sexual Activity   • Alcohol use: Never   • Drug use: Not Currently     Types: Methamphetamines     Comment: quit x 3wks   • Sexual activity: Defer           Objective   Physical Exam  Vitals and nursing note reviewed.   Constitutional:       General: He is not in acute distress.     Appearance: Normal appearance. He is normal weight. He is not ill-appearing, toxic-appearing or diaphoretic.   HENT:      Head: Normocephalic and atraumatic.      Nose: Nose normal.   Eyes:      Extraocular Movements: Extraocular movements intact.   Cardiovascular:      Rate and Rhythm: Normal rate and regular rhythm.   Pulmonary:      Effort: Pulmonary effort is normal.   Abdominal:      General: Abdomen is flat.      Palpations: Abdomen is soft.      Tenderness: There is no abdominal tenderness.   Musculoskeletal:         General: Normal range of motion.      Cervical back: Normal range of motion.   Skin:     General: Skin is warm and dry.   Neurological:      General: No focal deficit present.      Mental Status: He is alert and oriented to person, place, and time. Mental status is at baseline.   Psychiatric:         Speech: Speech normal.         Behavior: Behavior normal.         Thought Content: Thought content includes suicidal ideation. Thought content does not include homicidal ideation. Thought content does not include suicidal plan.         Procedures           ED Course  ED Course as of 05/02/22 0349   Sun May 01, 2022   2154 THC Screen, Urine(!): Positive [TM]   2154 Methamphetamine, Ur(!): Positive [TM]   2154 Opiate Screen(!): Positive [TM]   2154 Amphetamine, Urine Qual(!): Positive [TM]      ED Course User Index  [TM] Jeovany Fuller PA-C                                                 MDM  Number of Diagnoses or Management Options  Substance abuse (HCC): new and requires workup  Suicidal ideations: new and requires workup     Amount and/or Complexity of Data Reviewed  Clinical lab  tests: reviewed and ordered  Tests in the radiology section of CPT®: reviewed and ordered  Tests in the medicine section of CPT®: ordered and reviewed  Independent visualization of images, tracings, or specimens: yes    Risk of Complications, Morbidity, and/or Mortality  Presenting problems: high  Diagnostic procedures: high  Management options: high        Final diagnoses:   Substance abuse (HCC)   Suicidal ideations       ED Disposition  ED Disposition     ED Disposition   DC/Transfer to Behavioral Health Condition   Stable    Comment   --             No follow-up provider specified.       Medication List      No changes were made to your prescriptions during this visit.          Iris Esparza DO  05/02/22 0349

## 2022-05-02 NOTE — NURSING NOTE
"Patient refused labs this am. Patient very irritable and angry proceeded to curse this lpn. Patient stated \"they already got my fucking blood at the other hospital\". Advised patient that the doctor had ordered more labs. Patient stated \"tell him to take his dam finger and punch it up on the [employer] computer\". Dr. Danis Del Real notified of patient refusing labs and patients behavior and language.  "

## 2022-05-03 LAB
DEPRECATED RDW RBC AUTO: 49.1 FL (ref 37–54)
ERYTHROCYTE [DISTWIDTH] IN BLOOD BY AUTOMATED COUNT: 14.3 % (ref 12.3–15.4)
HCT VFR BLD AUTO: 43.6 % (ref 37.5–51)
HGB BLD-MCNC: 14.2 G/DL (ref 13–17.7)
MCH RBC QN AUTO: 30.4 PG (ref 26.6–33)
MCHC RBC AUTO-ENTMCNC: 32.6 G/DL (ref 31.5–35.7)
MCV RBC AUTO: 93.4 FL (ref 79–97)
PLATELET # BLD AUTO: 592 10*3/MM3 (ref 140–450)
PMV BLD AUTO: 10.1 FL (ref 6–12)
QT INTERVAL: 412 MS
QTC INTERVAL: 457 MS
RBC # BLD AUTO: 4.67 10*6/MM3 (ref 4.14–5.8)
WBC NRBC COR # BLD: 11.14 10*3/MM3 (ref 3.4–10.8)

## 2022-05-03 PROCEDURE — 85027 COMPLETE CBC AUTOMATED: CPT | Performed by: PSYCHIATRY & NEUROLOGY

## 2022-05-03 PROCEDURE — 99232 SBSQ HOSP IP/OBS MODERATE 35: CPT | Performed by: PSYCHIATRY & NEUROLOGY

## 2022-05-03 RX ADMIN — NICOTINE TRANSDERMAL SYSTEM 1 PATCH: 21 PATCH, EXTENDED RELEASE TRANSDERMAL at 10:35

## 2022-05-03 RX ADMIN — ESCITALOPRAM 5 MG: 10 TABLET, FILM COATED ORAL at 20:28

## 2022-05-03 RX ADMIN — NITROFURANTOIN MONOHYDRATE/MACROCRYSTALLINE 100 MG: 25; 75 CAPSULE ORAL at 10:35

## 2022-05-03 RX ADMIN — CYCLOBENZAPRINE 10 MG: 10 TABLET, FILM COATED ORAL at 20:28

## 2022-05-03 RX ADMIN — DICYCLOMINE HYDROCHLORIDE 10 MG: 10 CAPSULE ORAL at 20:28

## 2022-05-03 RX ADMIN — NITROFURANTOIN MONOHYDRATE/MACROCRYSTALLINE 100 MG: 25; 75 CAPSULE ORAL at 20:28

## 2022-05-03 RX ADMIN — ACETAMINOPHEN 650 MG: 325 TABLET ORAL at 10:34

## 2022-05-03 RX ADMIN — TRAZODONE HYDROCHLORIDE 50 MG: 50 TABLET ORAL at 20:28

## 2022-05-03 NOTE — PLAN OF CARE
Problem: Adult Behavioral Health Plan of Care  Goal: Plan of Care Review  Outcome: Ongoing, Progressing  Flowsheets  Taken 5/3/2022 1348 by Jovanni Guerrero, RN  Outcome Evaluation: PATIENT APPEARS TO BE IMPROVING, NO S/S OF ACUTE DISTRESS NOTED  Taken 5/3/2022 1325 by Clare Hill MSW  Progress: improving  Plan of Care Reviewed With:   patient   guardian  Patient Agreement with Plan of Care: agrees  Taken 5/3/2022 0810 by Jovanni Guerrero, RN  Plan of Care Reviewed With: patient  Patient Agreement with Plan of Care: agrees   Goal Outcome Evaluation:  Plan of Care Reviewed With: patient  Patient Agreement with Plan of Care: agrees     Progress: improving  Outcome Evaluation: PATIENT APPEARS TO BE IMPROVING, NO S/S OF ACUTE DISTRESS NOTED
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
## 2022-05-03 NOTE — PROGRESS NOTES
" Inpatient Psych Progress Note     Clinician: Danis Del Real MD  Admission Date: 5/2/2022  09:31 EDT 05/03/22    Behavioral Health Treatment Plan and Problem List: I have reviewed and approved the Behavioral Health Treatment Plan and Problem list.    Allergies  Allergies   Allergen Reactions   • Bactrim [Sulfamethoxazole-Trimethoprim] Swelling       Hospital Day: 1 day      Assessment completed within view of staff    History  CC/clinical focus: SI    Interval HPI: Patient seen and evaluated by me.  Chart reviewed. Patient reports persisting depression.    Patient rates  level of depression (subjectively) at a  10 /10.  Anxiety  10 /10.   He reports persisting SI.      Patient tolerating meds okay.  Denies side effects.  Tolerating medication okay.  Med Compliant.  ROS otherwise as below.      Interval Review of Systems:   General ROS: negative for - fever or malaise  Endocrine ROS: negative for - palpitations  Respiratory ROS: no cough, shortness of breath, or wheezing  Cardiovascular ROS: no chest pain or dyspnea on exertion  Gastrointestinal ROS: no abdominal pain,no black or bloody stools    /82 (BP Location: Right arm, Patient Position: Sitting)   Pulse 74   Temp 97.7 °F (36.5 °C) (Temporal)   Resp 18   Ht 172.7 cm (68\")   Wt 73.9 kg (163 lb)   SpO2 98%   BMI 24.78 kg/m²     Mental Status Exam  Mood: depressed  Affect: mood congruent  Thought Processes: linear  Thought Content: no delusions or hallucinations  Hallucinations: No  Suicidal Thoughts: yes  Suicidal Plan/Intent: denies  Hopelesness:moderate  Homicidal Thoughts:  denies      Medical Decision Making:   Labs:     Lab Results (last 24 hours)     ** No results found for the last 24 hours. **            Radiology:     Imaging Results (Last 24 Hours)     ** No results found for the last 24 hours. **            EKG:     ECG/EMG Results (most recent)     Procedure Component Value Units Date/Time    ECG 12 Lead [[id number]] Collected: 05/02/22 " 1546     Updated: 05/02/22 1548     QT Interval 412 ms      QTC Interval 457 ms     Narrative:      Test Reason : Baseline Cardiac Status  Blood Pressure :   */*   mmHG  Vent. Rate :  74 BPM     Atrial Rate :  74 BPM     P-R Int : 130 ms          QRS Dur :  76 ms      QT Int : 412 ms       P-R-T Axes :  61  35  64 degrees     QTc Int : 457 ms    Normal sinus rhythm  Normal ECG  No previous ECGs available    Referred By: NATHAN           Confirmed By:            Medications:  escitalopram, 5 mg, Oral, Nightly  nicotine, 1 patch, Transdermal, Q24H  nitrofurantoin (macrocrystal-monohydrate), 100 mg, Oral, BID           All medications reviewed.      Assessment and Plan:    Suicidal ideation  Unspecified Depressive Disorder  Psychosis   - Continue lexapro 5mg QHS    Amphetamine, Opiate, and Cannabis Use Disorder, Moderate  - Patient denies heavy use, stating that he does not think that he is at risk for withdrawal.  Denies withdrawal symptoms at this time.  - Will monitor for withdrawals and treat accordingly     Elevated WBC, elevated platelet count  - May be due to dehydration  - Will recheck in AM         Continue hospitalization for safety and stabilization.  Continue current level of Special Precautions (q15 minute checks).

## 2022-05-03 NOTE — PLAN OF CARE
Goal Outcome Evaluation:  Plan of Care Reviewed With: patient  Patient Agreement with Plan of Care: agrees        Outcome Evaluation: Rates anxiety a 6, depression a 10, denies si/hi/avh, no changes in treatment.

## 2022-05-04 PROCEDURE — 99232 SBSQ HOSP IP/OBS MODERATE 35: CPT | Performed by: PSYCHIATRY & NEUROLOGY

## 2022-05-04 RX ORDER — RISPERIDONE 0.25 MG/1
0.25 TABLET ORAL EVERY 12 HOURS SCHEDULED
Status: DISCONTINUED | OUTPATIENT
Start: 2022-05-04 | End: 2022-05-05

## 2022-05-04 RX ORDER — ESCITALOPRAM OXALATE 10 MG/1
10 TABLET ORAL NIGHTLY
Status: DISCONTINUED | OUTPATIENT
Start: 2022-05-04 | End: 2022-05-10 | Stop reason: HOSPADM

## 2022-05-04 RX ADMIN — NITROFURANTOIN MONOHYDRATE/MACROCRYSTALLINE 100 MG: 25; 75 CAPSULE ORAL at 09:09

## 2022-05-04 RX ADMIN — HYDROXYZINE HYDROCHLORIDE 50 MG: 50 TABLET ORAL at 20:17

## 2022-05-04 RX ADMIN — RISPERIDONE 0.25 MG: 0.25 TABLET, FILM COATED ORAL at 20:17

## 2022-05-04 RX ADMIN — RISPERIDONE 0.25 MG: 0.25 TABLET, FILM COATED ORAL at 13:27

## 2022-05-04 RX ADMIN — TRAZODONE HYDROCHLORIDE 50 MG: 50 TABLET ORAL at 20:17

## 2022-05-04 RX ADMIN — NITROFURANTOIN MONOHYDRATE/MACROCRYSTALLINE 100 MG: 25; 75 CAPSULE ORAL at 20:17

## 2022-05-04 RX ADMIN — ESCITALOPRAM 10 MG: 10 TABLET, FILM COATED ORAL at 20:17

## 2022-05-04 RX ADMIN — NICOTINE TRANSDERMAL SYSTEM 1 PATCH: 21 PATCH, EXTENDED RELEASE TRANSDERMAL at 09:09

## 2022-05-04 NOTE — PROGRESS NOTES
"INPATIENT PSYCHIATRIC PROGRESS NOTE    Name:  Gera Stern  :  1975  MRN:  [id number]  Visit Number:  [id number]  Length of stay:  2    SUBJECTIVE  CC/Focus of Exam: depression, SI, psychosis    INTERVAL HISTORY:  The patient states he is feeling depressed and anxious, and he would like to take some daytime medication for depression and anxiety. He feels the 5 mg of escitalopram is not enough for him. He reports ongoing suicidal thoughts with a plan to either hang himself or cutting self with glass. States he feels safe in the hospital.   Depression rating 10/10  Anxiety rating 10/10  Sleep: not good  Withdrawal sx: denies  Cravin/10    Review of Systems   Respiratory: Negative.    Cardiovascular: Negative.    Gastrointestinal: Negative.    Psychiatric/Behavioral: Positive for dysphoric mood. The patient is nervous/anxious.        OBJECTIVE    Temp:  [97.8 °F (36.6 °C)-98.6 °F (37 °C)] 98.5 °F (36.9 °C)  Heart Rate:  [60-85] 60  Resp:  [18] 18  BP: (103-138)/(64-90) 106/69    MENTAL STATUS EXAM:  Appearance:Casually dressed, good hygeine.   Cooperation:Cooperative  Psychomotor: No psychomotor agitation/retardation, No EPS, No motor tics  Speech-normal rate, amount.  Mood \"depressed and anxious\"   Affect-congruent, appropriate, stable  Thought Content-goal directed, no delusional material present  Thought process-linear, organized.  Suicidality: No SI  Homicidality: No HI  Perception: No AH/VH  Insight-fair   Judgement-fair    Lab Results (last 24 hours)     ** No results found for the last 24 hours. **             Imaging Results (Last 24 Hours)     ** No results found for the last 24 hours. **             ECG/EMG Results (most recent)     Procedure Component Value Units Date/Time    ECG 12 Lead [[id number]] Collected: 22 1546     Updated: 22 1046     QT Interval 412 ms      QTC Interval 457 ms     Narrative:      Test Reason : Baseline Cardiac Status  Blood Pressure :   */*   mmHG  Vent. " Rate :  74 BPM     Atrial Rate :  74 BPM     P-R Int : 130 ms          QRS Dur :  76 ms      QT Int : 412 ms       P-R-T Axes :  61  35  64 degrees     QTc Int : 457 ms    Normal sinus rhythm  Normal ECG  No previous ECGs available  Confirmed by Andrew Reddy () on 5/3/2022 10:46:06 AM    Referred By: NATHAN           Confirmed By: Andrew Reddy           ALLERGIES: Bactrim [sulfamethoxazole-trimethoprim]      Current Facility-Administered Medications:   •  acetaminophen (TYLENOL) tablet 650 mg, 650 mg, Oral, Q6H PRN, Frederick Austin MD, 650 mg at 22 1034  •  aluminum-magnesium hydroxide-simethicone (MAALOX MAX) 400-400-40 MG/5ML suspension 15 mL, 15 mL, Oral, Q6H PRN, Frederick Austin MD  •  benzonatate (TESSALON) capsule 100 mg, 100 mg, Oral, TID PRN, Frederick Austin MD  •  benztropine (COGENTIN) tablet 2 mg, 2 mg, Oral, Once PRN **OR** benztropine (COGENTIN) injection 1 mg, 1 mg, Intramuscular, Once PRN, Frederick Austin MD  •  [] cloNIDine (CATAPRES) tablet 0.1 mg, 0.1 mg, Oral, 4x Daily PRN **FOLLOWED BY** cloNIDine (CATAPRES) tablet 0.1 mg, 0.1 mg, Oral, TID PRN **FOLLOWED BY** [START ON 2022] cloNIDine (CATAPRES) tablet 0.1 mg, 0.1 mg, Oral, BID PRN **FOLLOWED BY** [START ON 2022] cloNIDine (CATAPRES) tablet 0.1 mg, 0.1 mg, Oral, Daily PRN, Fredeirck Austin MD  •  cyclobenzaprine (FLEXERIL) tablet 10 mg, 10 mg, Oral, TID PRN, Frederick Austin MD, 10 mg at 22  •  dicyclomine (BENTYL) capsule 10 mg, 10 mg, Oral, TID PRN, Frederick Austin MD, 10 mg at 22  •  escitalopram (LEXAPRO) tablet 5 mg, 5 mg, Oral, Nightly, Danis Del Real MD, 5 mg at 22  •  famotidine (PEPCID) tablet 20 mg, 20 mg, Oral, BID PRN, Frederick Austin MD  •  hydrOXYzine (ATARAX) tablet 50 mg, 50 mg, Oral, Q6H PRN, Frederick Austin MD  •  ibuprofen (ADVIL,MOTRIN) tablet 400 mg, 400 mg, Oral, Q6H PRN, Frederick Austin MD  •  loperamide (IMODIUM) capsule 2 mg, 2 mg, Oral, Q2H PRN, Frederick Austin,  MD  •  magnesium hydroxide (MILK OF MAGNESIA) suspension 10 mL, 10 mL, Oral, Daily PRN, Frederick Austin MD  •  nicotine (NICODERM CQ) 21 MG/24HR patch 1 patch, 1 patch, Transdermal, Q24H, Frederick Austin MD, 1 patch at 05/04/22 0909  •  nitrofurantoin (macrocrystal-monohydrate) (MACROBID) capsule 100 mg, 100 mg, Oral, BID, Frederick Austin MD, 100 mg at 05/04/22 0909  •  ondansetron (ZOFRAN) tablet 4 mg, 4 mg, Oral, Q6H PRN, Frederick Austin MD  •  sodium chloride nasal spray 2 spray, 2 spray, Each Nare, PRN, Frederick Austin MD  •  traZODone (DESYREL) tablet 50 mg, 50 mg, Oral, Nightly PRN, Frederick Austin MD, 50 mg at 05/03/22 2028    ASSESSMENT & PLAN:     Suicidal ideation  Unspecified Depressive Disorder  Psychosis  - Increase lexapro 10 mg QHS  - Start risperidone 0.25 mg bid     Amphetamine, Opiate, and Cannabis Use Disorder, Moderate  - Patient denies heavy use, stating that he does not think that he is at risk for withdrawal.  Denies withdrawal symptoms at this time.  - Will monitor for withdrawals and treat accordingly  - Clonidine detox prn     Elevated WBC, elevated platelet count  - May be due to dehydration  - Recheck this am shows improvement    Special precautions: Special Precautions Level 3 (q15 min checks) .    Behavioral Health Treatment Plan and Problem List: I have reviewed and approved the Behavioral Health Treatment Plan and Problem list.  The patient has had a chance to review and agrees with the treatment plan.     Clinician:  Alejandro Coppola MD  05/04/22  13:04 EDT

## 2022-05-04 NOTE — PLAN OF CARE
Problem: Adult Behavioral Health Plan of Care  Goal: Plan of Care Review  Outcome: Ongoing, Progressing  Goal: Patient-Specific Goal (Individualization)  Outcome: Ongoing, Progressing  Goal: Adheres to Safety Considerations for Self and Others  Outcome: Ongoing, Progressing  Goal: Absence of New-Onset Illness or Injury  Outcome: Ongoing, Progressing  Goal: Optimized Coping Skills in Response to Life Stressors  Outcome: Ongoing, Progressing  Goal: Develops/Participates in Therapeutic [place] to Support Successful Transition  Outcome: Ongoing, Progressing     Problem: Suicidal Behavior  Goal: Suicidal Behavior is Absent or Managed  Outcome: Ongoing, Progressing     Problem: Activity and Energy Impairment (Excessive Substance Use)  Goal: Optimized Energy Level (Excessive Substance Use)  Outcome: Ongoing, Progressing     Problem: Behavior Regulation Impairment (Excessive Substance Use)  Goal: Improved Behavioral Control (Excessive Substance Use)  Outcome: Ongoing, Progressing     Problem: Decreased Participation and Engagement (Excessive Substance Use)  Goal: Increased Participation and Engagement (Excessive Substance Use)  Outcome: Ongoing, Progressing     Problem: Physiologic Impairment (Excessive Substance Use)  Goal: Improved Physiologic Symptoms (Excessive Substance Use)  Outcome: Ongoing, Progressing     Problem: Social, Occupational or Functional Impairment (Excessive Substance Use)  Goal: Enhanced Social, Occupational or Functional Skills (Excessive Substance Use)  Outcome: Ongoing, Progressing     Problem: Activity and Energy Impairment (Anxiety Signs/Symptoms)  Goal: Optimized Energy Level (Anxiety Signs/Symptoms)  Outcome: Ongoing, Progressing     Problem: Cognitive Impairment (Anxiety Signs/Symptoms)  Goal: Optimized Cognitive Function (Anxiety Signs/Symptoms)  Outcome: Ongoing, Progressing     Problem: Mood Impairment (Anxiety Signs/Symptoms)  Goal: Improved Mood Symptoms (Anxiety  Signs/Symptoms)  Outcome: Ongoing, Progressing     Problem: Sleep Impairment (Anxiety Signs/Symptoms)  Goal: Improved Sleep (Anxiety Signs/Symptoms)  Outcome: Ongoing, Progressing     Problem: Social, Occupational or Functional Impairment (Anxiety Signs/Symptoms)  Goal: Enhanced Social, Occupational or Functional Skills (Anxiety Signs/Symptoms)  Outcome: Ongoing, Progressing     Problem: Somatic Disturbance (Anxiety Signs/Symptoms)  Goal: Improved Somatic Symptoms (Anxiety Signs/Symptoms)  Outcome: Ongoing, Progressing     Problem: Activity and Energy Impairment (Depressive Signs/Symptoms)  Goal: Optimized Energy Level (Depressive Signs/Symptoms)  Outcome: Ongoing, Progressing     Problem: Cognitive Impairment (Depressive Signs/Symptoms)  Goal: Optimized Cognitive Function  Outcome: Ongoing, Progressing     Problem: Decreased Participation/Engagement (Depressive Signs/Symptoms)  Goal: Increased Participation and Engagement (Depressive Signs/Symptoms)  Outcome: Ongoing, Progressing     Problem: Feelings of Worthlessness, Hopelessness or Excessive Guilt (Depressive Signs/Symptoms)  Goal: Enhanced Self-Esteem and Confidence (Depressive Signs/Symptoms)  Outcome: Ongoing, Progressing     Problem: Mood Impairment (Depressive Signs/Symptoms)  Goal: Improved Mood Symptoms (Depressive Signs/Symptoms)  Outcome: Ongoing, Progressing     Problem: Nutrition Imbalance (Depressive Signs/Symptoms)  Goal: Optimized Nutrition Intake  Outcome: Ongoing, Progressing     Problem: Psychomotor Impairment (Depressive Signs/Symptoms)  Goal: Improved Psychomotor Symptoms (Depressive Signs/Symptoms)  Outcome: Ongoing, Progressing     Problem: Sleep Disturbance (Depressive Signs/Symptoms)  Goal: Improved Sleep (Depressive Signs/Symptoms)  Outcome: Ongoing, Progressing     Problem: Social, Occupational or Functional Impairment (Depressive Signs/Symptoms)  Goal: Enhanced Social, Occupational or Functional Skills (Depressive  Signs/Symptoms)  Outcome: Ongoing, Progressing   Goal Outcome Evaluation:              Outcome Evaluation: Patient reports improvement . Denies Craving and withdrawal symptoms. Vital signs stable

## 2022-05-04 NOTE — PLAN OF CARE
Goal Outcome Evaluation:  Plan of Care Reviewed With: patient, guardian  Patient Agreement with Plan of Care: agrees  Consent Given to Review Plan with: Declines family involvement.  Progress: improving  Outcome Evaluation: Therapist met with Patient one-on-one.    Problem: Adult Behavioral Health Plan of Care  Goal: Plan of Care Review  Outcome: Ongoing, Progressing  Flowsheets  Taken 5/4/2022 1445  Progress: improving  Plan of Care Reviewed With:   patient   guardian  Patient Agreement with Plan of Care: agrees  Outcome Evaluation: Therapist met with Patient one-on-one.  Taken 5/2/2022 0951  Consent Given to Review Plan with: Declines family involvement.  Goal: Optimized Coping Skills in Response to Life Stressors  Outcome: Ongoing, Progressing  Flowsheets (Taken 5/4/2022 1445)  Optimized Coping Skills in Response to Life Stressors: making progress toward outcome  Intervention: Promote Effective Coping Strategies  Flowsheets (Taken 5/4/2022 1445)  Supportive Measures:   active listening utilized   positive reinforcement provided   decision-making supported   verbalization of feelings encouraged  Goal: Develops/Participates in Therapeutic [place] to Support Successful Transition  Outcome: Ongoing, Progressing  Flowsheets (Taken 5/4/2022 1445)  Develops/Participates in Therapeutic [place] to Support Successful Transition: making progress toward outcome  Intervention: Foster Therapeutic [place]  Flowsheets (Taken 5/4/2022 1445)  Trust Relationship/Rapport:   care explained   questions encouraged   reassurance provided   choices provided   emotional support provided   thoughts/feelings acknowledged   empathic listening provided   questions answered  Intervention: Mutually Develop Transition Plan  Flowsheets (Taken 5/4/2022 1445)  Transition Support:   community resources reviewed   follow-up care coordinated   follow-up care discussed   crisis management plan promoted   crisis management plan verbalized     DATA:  "Therapist met with Patient individually this date. Patient agreeable to discuss current treatment progress and discharge concerns.     Patient continues to refuse to sign any consents for family and aftercare, despite much encouragement stating \"It is no one's business that I'm here and I still don't want to sign anything.\"     CLINICAL MANUVERING/INTERVENTIONS:  Assisted Patient in processing session content; acknowledged and normalized Patient’s thoughts, feelings, and concerns by utilizing a person-centered approach in efforts to build appropriate rapport and a positive therapeutic relationship with open and honest communication. Allowed Patient to ventilate regarding current stressors and triggers for negative emotions and thoughts in a safe nonjudgmental environment with unconditional positive regard, active listening skills, and empathy.     ASSESSMENT: Patient was seen today for a follow up. He states he is feeling some better today and feels like the medication is helping. Patient still remains scattered and jumps topic to topic. He states he is only here to get his disability and wants to stay in a hospital long-term. Patient continues to decline rehab or assistance with any other discharge planning, and states he plans to walk to the next \"mental hospital\" when he discharged from here.     PLAN: Patient will continue stabilization. Patient will continue to receive services offered by Treatment Team.     Patient continues to decline aftercare.   "

## 2022-05-04 NOTE — PLAN OF CARE
Problem: Adult Behavioral Health Plan of Care  Goal: Plan of Care Review  Outcome: Ongoing, Progressing  Flowsheets  Taken 5/4/2022 1359  Progress: no change  Outcome Evaluation: PATIENT VERBALIZES SEVERE ANXIETY AND DEPRESSION, SI WITH NO PLAN, AUDITORY HALLUCINATIONS. NO CRAVING OR S/S OF WITHDRAWAL. PATIENT IS AOX3 AND COOPERATIVE WITH NO S/S OF ACUTE DISTRESS NOTED.  Taken 5/4/2022 0738  Plan of Care Reviewed With: patient  Patient Agreement with Plan of Care: agrees   Goal Outcome Evaluation:  Plan of Care Reviewed With: patient  Patient Agreement with Plan of Care: agrees     Progress: no change  Outcome Evaluation: PATIENT VERBALIZES SEVERE ANXIETY AND DEPRESSION, SI WITH NO PLAN, AUDITORY HALLUCINATIONS. NO CRAVING OR S/S OF WITHDRAWAL. PATIENT IS AOX3 AND COOPERATIVE WITH NO S/S OF ACUTE DISTRESS NOTED.

## 2022-05-05 PROCEDURE — 99232 SBSQ HOSP IP/OBS MODERATE 35: CPT | Performed by: PSYCHIATRY & NEUROLOGY

## 2022-05-05 RX ORDER — RISPERIDONE 0.25 MG/1
0.5 TABLET ORAL EVERY 12 HOURS SCHEDULED
Status: DISCONTINUED | OUTPATIENT
Start: 2022-05-05 | End: 2022-05-10 | Stop reason: HOSPADM

## 2022-05-05 RX ADMIN — RISPERIDONE 0.25 MG: 0.25 TABLET, FILM COATED ORAL at 09:01

## 2022-05-05 RX ADMIN — ESCITALOPRAM 10 MG: 10 TABLET, FILM COATED ORAL at 20:59

## 2022-05-05 RX ADMIN — TRAZODONE HYDROCHLORIDE 50 MG: 50 TABLET ORAL at 20:59

## 2022-05-05 RX ADMIN — NITROFURANTOIN MONOHYDRATE/MACROCRYSTALLINE 100 MG: 25; 75 CAPSULE ORAL at 20:59

## 2022-05-05 RX ADMIN — RISPERIDONE 0.5 MG: 0.25 TABLET, FILM COATED ORAL at 20:59

## 2022-05-05 RX ADMIN — HYDROXYZINE HYDROCHLORIDE 50 MG: 50 TABLET ORAL at 20:59

## 2022-05-05 RX ADMIN — IBUPROFEN 400 MG: 400 TABLET, FILM COATED ORAL at 20:59

## 2022-05-05 RX ADMIN — NITROFURANTOIN MONOHYDRATE/MACROCRYSTALLINE 100 MG: 25; 75 CAPSULE ORAL at 09:01

## 2022-05-05 RX ADMIN — HYDROXYZINE HYDROCHLORIDE 50 MG: 50 TABLET ORAL at 13:36

## 2022-05-05 RX ADMIN — NICOTINE TRANSDERMAL SYSTEM 1 PATCH: 21 PATCH, EXTENDED RELEASE TRANSDERMAL at 09:01

## 2022-05-05 NOTE — PLAN OF CARE
Goal Outcome Evaluation:  Plan of Care Reviewed With: patient  Patient Agreement with Plan of Care: agrees

## 2022-05-05 NOTE — PLAN OF CARE
Goal Outcome Evaluation:  Plan of Care Reviewed With: patient  Patient Agreement with Plan of Care: agrees        Outcome Evaluation: Patient reports anxiety and depression at 10.  Reports auditory and visual hallucinations.  Reports SI with no plan.  Patient agrees to seek out staff when having thoughts.

## 2022-05-05 NOTE — PLAN OF CARE
Goal Outcome Evaluation:  Plan of Care Reviewed With: patient, guardian  Patient Agreement with Plan of Care: agrees  Consent Given to Review Plan with: Declines family involvement.  Progress: improving  Outcome Evaluation: Therapist met with Patient one-on-one.    Problem: Adult Behavioral Health Plan of Care  Goal: Plan of Care Review  Outcome: Ongoing, Progressing  Flowsheets  Taken 5/5/2022 1120  Progress: improving  Plan of Care Reviewed With:  • patient  • guardian  Patient Agreement with Plan of Care: agrees  Outcome Evaluation: Therapist met with Patient one-on-one.  Taken 5/2/2022 0951  Consent Given to Review Plan with: Declines family involvement.  Goal: Optimized Coping Skills in Response to Life Stressors  Outcome: Ongoing, Progressing  Flowsheets (Taken 5/5/2022 1120)  Optimized Coping Skills in Response to Life Stressors: making progress toward outcome  Intervention: Promote Effective Coping Strategies  Flowsheets (Taken 5/5/2022 1120)  Supportive Measures:  • active listening utilized  • decision-making supported  • positive reinforcement provided  • verbalization of feelings encouraged  Goal: Develops/Participates in Therapeutic [place] to Support Successful Transition  Outcome: Ongoing, Progressing  Flowsheets (Taken 5/5/2022 1120)  Develops/Participates in Therapeutic [place] to Support Successful Transition: making progress toward outcome  Intervention: Foster Therapeutic [place]  Flowsheets (Taken 5/5/2022 1120)  Trust Relationship/Rapport:  • care explained  • questions encouraged  • choices provided  • reassurance provided  • emotional support provided  • empathic listening provided  • questions answered  • thoughts/feelings acknowledged  Intervention: Mutually Develop Transition Plan  Flowsheets (Taken 5/5/2022 1120)  Transition Support:  • community resources reviewed  • follow-up care coordinated  • follow-up care discussed  • crisis management plan promoted  • crisis management plan  verbalized     DATA: Therapist met with Patient individually this date. Patient agreeable to discuss current treatment progress and discharge concerns.     Patient signed consent for [facility].    Attempted contact with the shelter and the number was busy, x3.     1320:    Spoke with [facility] who states Patient will have to have a negative COVID test, a warrant check, an ID, pass a drug screen, and will have to have a job within 21 days to be accepted.     1454:    Patient signed consent for the [facility] and [facility] in [place]. Attempted contact with both places, no answer.     Patient states he is banned from most of the homeless shelters in [place], and does not have an ID.     Patient remains very adamant he is not going to rehab.     CLINICAL MANUVERING/INTERVENTIONS:  Assisted Patient in processing session content; acknowledged and normalized Patient’s thoughts, feelings, and concerns by utilizing a person-centered approach in efforts to build appropriate rapport and a positive therapeutic relationship with open and honest communication. Allowed Patient to ventilate regarding current stressors and triggers for negative emotions and thoughts in a safe nonjudgmental environment with unconditional positive regard, active listening skills, and empathy.     ASSESSMENT: Patient was seen today for a follow up. He states he is feeling the same and continues to endorse SI. Patient reports poor sleep and feels like his medication is helping but needs it to be stronger. Patient does state today that his plan is to go to a homeless shelter at discharge. He is more cooperative now, but does remain irritable at times.     PLAN: Patient will continue stabilization. Patient will continue to receive services offered by Treatment Team.     Signed consent for [facility].

## 2022-05-05 NOTE — PROGRESS NOTES
"INPATIENT PSYCHIATRIC PROGRESS NOTE    Name:  Gera Stern  :  1975  MRN:  [id number]  Visit Number:  [id number]  Length of stay:  3    SUBJECTIVE  CC/Focus of Exam: depression, SI, psychosis    INTERVAL HISTORY:  The patient states the new medication is helping but wants it to be stronger. States he is not sleeping good at night but also took a few naps yesterday. He reports ongoing SI but feels safe in the hospital.    Depression rating 8/10  Anxiety rating 8/10  Sleep: not good  Withdrawal sx: denies  Cravin/10    Review of Systems   Respiratory: Negative.    Cardiovascular: Negative.    Gastrointestinal: Negative.    Psychiatric/Behavioral: Positive for dysphoric mood and suicidal ideas. The patient is nervous/anxious.        OBJECTIVE    Temp:  [98 °F (36.7 °C)-98.6 °F (37 °C)] 98.6 °F (37 °C)  Heart Rate:  [60-89] 60  Resp:  [16-18] 18  BP: (111-141)/(61-85) 111/69    MENTAL STATUS EXAM:  Appearance:Casually dressed, good hygeine.   Cooperation:Cooperative  Psychomotor: No psychomotor agitation/retardation, No EPS, No motor tics  Speech-normal rate, amount.  Mood \"depressed and anxious\"   Affect-congruent, appropriate, stable  Thought Content-goal directed, no delusional material present  Thought process-linear, organized.  Suicidality: + SI  Homicidality: No HI  Perception: No AH/VH  Insight-fair   Judgement-fair    Lab Results (last 24 hours)     ** No results found for the last 24 hours. **             Imaging Results (Last 24 Hours)     ** No results found for the last 24 hours. **             ECG/EMG Results (most recent)     Procedure Component Value Units Date/Time    ECG 12 Lead [[id number]] Collected: 22 1546     Updated: 22 1046     QT Interval 412 ms      QTC Interval 457 ms     Narrative:      Test Reason : Baseline Cardiac Status  Blood Pressure :   */*   mmHG  Vent. Rate :  74 BPM     Atrial Rate :  74 BPM     P-R Int : 130 ms          QRS Dur :  76 ms      QT Int : 412 ms "       P-R-T Axes :  61  35  64 degrees     QTc Int : 457 ms    Normal sinus rhythm  Normal ECG  No previous ECGs available  Confirmed by Andrew Reddy () on 5/3/2022 10:46:06 AM    Referred By: NATHAN           Confirmed By: Andrew Reddy           ALLERGIES: Bactrim [sulfamethoxazole-trimethoprim]      Current Facility-Administered Medications:   •  acetaminophen (TYLENOL) tablet 650 mg, 650 mg, Oral, Q6H PRN, Frederick Austin MD, 650 mg at 22 1034  •  aluminum-magnesium hydroxide-simethicone (MAALOX MAX) 400-400-40 MG/5ML suspension 15 mL, 15 mL, Oral, Q6H PRN, Frederick Austin MD  •  benzonatate (TESSALON) capsule 100 mg, 100 mg, Oral, TID PRN, Frederick Austin MD  •  benztropine (COGENTIN) tablet 2 mg, 2 mg, Oral, Once PRN **OR** benztropine (COGENTIN) injection 1 mg, 1 mg, Intramuscular, Once PRN, Frederick Austin MD  •  [] cloNIDine (CATAPRES) tablet 0.1 mg, 0.1 mg, Oral, 4x Daily PRN **FOLLOWED BY** [] cloNIDine (CATAPRES) tablet 0.1 mg, 0.1 mg, Oral, TID PRN **FOLLOWED BY** cloNIDine (CATAPRES) tablet 0.1 mg, 0.1 mg, Oral, BID PRN **FOLLOWED BY** [START ON 2022] cloNIDine (CATAPRES) tablet 0.1 mg, 0.1 mg, Oral, Daily PRN, Frederick Austin MD  •  cyclobenzaprine (FLEXERIL) tablet 10 mg, 10 mg, Oral, TID PRN, Frederick Austin MD, 10 mg at 22  •  dicyclomine (BENTYL) capsule 10 mg, 10 mg, Oral, TID PRN, Frederick Austin MD, 10 mg at 22  •  escitalopram (LEXAPRO) tablet 10 mg, 10 mg, Oral, Nightly, Alejandro Coppola MD, 10 mg at 22 2017  •  famotidine (PEPCID) tablet 20 mg, 20 mg, Oral, BID PRN, Frederick Austin MD  •  hydrOXYzine (ATARAX) tablet 50 mg, 50 mg, Oral, Q6H PRN, Frederick Austin MD, 50 mg at 22  •  ibuprofen (ADVIL,MOTRIN) tablet 400 mg, 400 mg, Oral, Q6H PRN, Frederick Austin MD  •  loperamide (IMODIUM) capsule 2 mg, 2 mg, Oral, Q2H PRN, Frederick Austin MD  •  magnesium hydroxide (MILK OF MAGNESIA) suspension 10 mL, 10 mL, Oral, Daily PRN, Nathan  MD Frederick  •  nicotine (NICODERM CQ) 21 MG/24HR patch 1 patch, 1 patch, Transdermal, Q24H, Frederick Austin MD, 1 patch at 05/05/22 0901  •  nitrofurantoin (macrocrystal-monohydrate) (MACROBID) capsule 100 mg, 100 mg, Oral, BID, Frederick Austin MD, 100 mg at 05/05/22 0901  •  ondansetron (ZOFRAN) tablet 4 mg, 4 mg, Oral, Q6H PRN, Frederick Austin MD  •  risperiDONE (risperDAL) tablet 0.25 mg, 0.25 mg, Oral, Q12H, Alejandro Coppola MD, 0.25 mg at 05/05/22 0901  •  sodium chloride nasal spray 2 spray, 2 spray, Each Nare, PRN, Frederick Austin MD  •  traZODone (DESYREL) tablet 50 mg, 50 mg, Oral, Nightly PRN, Frederick Austin MD, 50 mg at 05/04/22 2017    ASSESSMENT & PLAN:     Suicidal ideation  Unspecified Depressive Disorder  Psychosis  - Continue lexapro 10 mg QHS  - Increase risperidone 0.5 mg bid     Amphetamine, Opiate, and Cannabis Use Disorder, Moderate  - Patient denies heavy use, stating that he does not think that he is at risk for withdrawal.  Denies withdrawal symptoms at this time.  - Will monitor for withdrawals and treat accordingly  - Clonidine detox prn     Elevated WBC, elevated platelet count  - May be due to dehydration  - Recheck this am shows improvement    Special precautions: Special Precautions Level 3 (q15 min checks) .    Behavioral Health Treatment Plan and Problem List: I have reviewed and approved the Behavioral Health Treatment Plan and Problem list.  The patient has had a chance to review and agrees with the treatment plan.     Clinician:  Alejandro Coppola MD  05/05/22  09:49 EDT

## 2022-05-06 PROCEDURE — 99232 SBSQ HOSP IP/OBS MODERATE 35: CPT | Performed by: PSYCHIATRY & NEUROLOGY

## 2022-05-06 RX ADMIN — NITROFURANTOIN MONOHYDRATE/MACROCRYSTALLINE 100 MG: 25; 75 CAPSULE ORAL at 20:45

## 2022-05-06 RX ADMIN — NITROFURANTOIN MONOHYDRATE/MACROCRYSTALLINE 100 MG: 25; 75 CAPSULE ORAL at 08:01

## 2022-05-06 RX ADMIN — RISPERIDONE 0.5 MG: 0.25 TABLET, FILM COATED ORAL at 08:01

## 2022-05-06 RX ADMIN — HYDROXYZINE HYDROCHLORIDE 50 MG: 50 TABLET ORAL at 15:10

## 2022-05-06 RX ADMIN — HYDROXYZINE HYDROCHLORIDE 50 MG: 50 TABLET ORAL at 20:45

## 2022-05-06 RX ADMIN — ESCITALOPRAM 10 MG: 10 TABLET, FILM COATED ORAL at 21:26

## 2022-05-06 RX ADMIN — TRAZODONE HYDROCHLORIDE 50 MG: 50 TABLET ORAL at 20:45

## 2022-05-06 RX ADMIN — RISPERIDONE 0.5 MG: 0.25 TABLET, FILM COATED ORAL at 20:45

## 2022-05-06 RX ADMIN — NICOTINE TRANSDERMAL SYSTEM 1 PATCH: 21 PATCH, EXTENDED RELEASE TRANSDERMAL at 08:01

## 2022-05-06 NOTE — PROGRESS NOTES
"INPATIENT PSYCHIATRIC PROGRESS NOTE    Name:  Gera Stern  :  1975  MRN:  [id number]  Visit Number:  [id number]  Length of stay:  4    SUBJECTIVE  CC/Focus of Exam: depression, SI, psychosis    INTERVAL HISTORY:  The patient states he is not feeling good and he is feeling very anxious. He states he has taken thorazine in the past and would like to take it but his QTc is prolonged. He reports ongoing suicidal ideations but he feels safe in the hospital.  Depression rating 9/10  Anxiety rating 9/10  Sleep: not good  Withdrawal sx: denies  Cravin/10    Review of Systems   Respiratory: Negative.    Cardiovascular: Negative.    Gastrointestinal: Negative.    Psychiatric/Behavioral: Positive for dysphoric mood and suicidal ideas. The patient is nervous/anxious.        OBJECTIVE    Temp:  [97.9 °F (36.6 °C)-99.3 °F (37.4 °C)] 99.3 °F (37.4 °C)  Heart Rate:  [62-86] 74  Resp:  [16-18] 16  BP: (103-129)/(60-91) 103/60    MENTAL STATUS EXAM:  Appearance:Casually dressed, good hygeine.   Cooperation:Cooperative  Psychomotor: No psychomotor agitation/retardation, No EPS, No motor tics  Speech-normal rate, amount.  Mood \"depressed and anxious\"   Affect-congruent, appropriate, stable  Thought Content-goal directed, no delusional material present  Thought process-linear, organized.  Suicidality: + SI  Homicidality: No HI  Perception: No AH/VH  Insight-fair   Judgement-fair    Lab Results (last 24 hours)     ** No results found for the last 24 hours. **             Imaging Results (Last 24 Hours)     ** No results found for the last 24 hours. **             ECG/EMG Results (most recent)     Procedure Component Value Units Date/Time    ECG 12 Lead [[id number]] Collected: 22 1546     Updated: 22 1046     QT Interval 412 ms      QTC Interval 457 ms     Narrative:      Test Reason : Baseline Cardiac Status  Blood Pressure :   */*   mmHG  Vent. Rate :  74 BPM     Atrial Rate :  74 BPM     P-R Int : 130 ms        "   QRS Dur :  76 ms      QT Int : 412 ms       P-R-T Axes :  61  35  64 degrees     QTc Int : 457 ms    Normal sinus rhythm  Normal ECG  No previous ECGs available  Confirmed by Andrew Reddy () on 5/3/2022 10:46:06 AM    Referred By: NATHAN           Confirmed By: Anrdew Reddy           ALLERGIES: Bactrim [sulfamethoxazole-trimethoprim]      Current Facility-Administered Medications:   •  acetaminophen (TYLENOL) tablet 650 mg, 650 mg, Oral, Q6H PRN, Frederick Austin MD, 650 mg at 22 1034  •  aluminum-magnesium hydroxide-simethicone (MAALOX MAX) 400-400-40 MG/5ML suspension 15 mL, 15 mL, Oral, Q6H PRN, Frederick Austin MD  •  benzonatate (TESSALON) capsule 100 mg, 100 mg, Oral, TID PRN, Frederick Austin MD  •  benztropine (COGENTIN) tablet 2 mg, 2 mg, Oral, Once PRN **OR** benztropine (COGENTIN) injection 1 mg, 1 mg, Intramuscular, Once PRN, Frederick Austin MD  •  [] cloNIDine (CATAPRES) tablet 0.1 mg, 0.1 mg, Oral, 4x Daily PRN **FOLLOWED BY** [] cloNIDine (CATAPRES) tablet 0.1 mg, 0.1 mg, Oral, TID PRN **FOLLOWED BY** [] cloNIDine (CATAPRES) tablet 0.1 mg, 0.1 mg, Oral, BID PRN **FOLLOWED BY** cloNIDine (CATAPRES) tablet 0.1 mg, 0.1 mg, Oral, Daily PRN, Frederick Austin MD  •  cyclobenzaprine (FLEXERIL) tablet 10 mg, 10 mg, Oral, TID PRN, Frederick Austin MD, 10 mg at 22  •  dicyclomine (BENTYL) capsule 10 mg, 10 mg, Oral, TID PRN, Frederick Austin MD, 10 mg at 22  •  escitalopram (LEXAPRO) tablet 10 mg, 10 mg, Oral, Nightly, Alejandro Coppola MD, 10 mg at 22  •  famotidine (PEPCID) tablet 20 mg, 20 mg, Oral, BID PRN, Frederick Austin MD  •  hydrOXYzine (ATARAX) tablet 50 mg, 50 mg, Oral, Q6H PRN, Frederick Austin MD, 50 mg at 220  •  ibuprofen (ADVIL,MOTRIN) tablet 400 mg, 400 mg, Oral, Q6H PRN, Frederick Austin MD, 400 mg at 22  •  loperamide (IMODIUM) capsule 2 mg, 2 mg, Oral, Q2H PRN, Frederick Austin MD  •  magnesium hydroxide (MILK OF  MAGNESIA) suspension 10 mL, 10 mL, Oral, Daily PRN, Frederick Austin MD  •  nicotine (NICODERM CQ) 21 MG/24HR patch 1 patch, 1 patch, Transdermal, Q24H, Frederick Austin MD, 1 patch at 05/06/22 0801  •  nitrofurantoin (macrocrystal-monohydrate) (MACROBID) capsule 100 mg, 100 mg, Oral, BID, Frederick Austin MD, 100 mg at 05/06/22 0801  •  ondansetron (ZOFRAN) tablet 4 mg, 4 mg, Oral, Q6H PRN, Frederick Austin MD  •  risperiDONE (risperDAL) tablet 0.5 mg, 0.5 mg, Oral, Q12H, Alejandro Coppola MD, 0.5 mg at 05/06/22 0801  •  sodium chloride nasal spray 2 spray, 2 spray, Each Nare, PRN, Frederick Austin MD  •  traZODone (DESYREL) tablet 50 mg, 50 mg, Oral, Nightly PRN, Frederick Austin MD, 50 mg at 05/05/22 2059    ASSESSMENT & PLAN:     Suicidal ideation  Unspecified Depressive Disorder  Psychosis  - Continue lexapro 10 mg QHS  - Continue risperidone 0.5 mg bid     Amphetamine, Opiate, and Cannabis Use Disorder, Moderate  - Patient denies heavy use, stating that he does not think that he is at risk for withdrawal.  Denies withdrawal symptoms at this time.  - Will monitor for withdrawals and treat accordingly  - Clonidine detox prn     Elevated WBC, elevated platelet count  - May be due to dehydration  - Recheck shows improvement    Special precautions: Special Precautions Level 3 (q15 min checks) .    Behavioral Health Treatment Plan and Problem List: I have reviewed and approved the Behavioral Health Treatment Plan and Problem list.  The patient has had a chance to review and agrees with the treatment plan.     Clinician:  Alejandro Coppola MD  05/06/22  17:27 EDT

## 2022-05-06 NOTE — PLAN OF CARE
Goal Outcome Evaluation:  Plan of Care Reviewed With: patient  Patient Agreement with Plan of Care: agrees        Outcome Evaluation: Patient reports anxiety and depression at 10.  Reports SI with no plan.  Patient reports auditory and visual hallucinations.  Patient cooperative this shift.
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
## 2022-05-06 NOTE — PLAN OF CARE
Goal Outcome Evaluation:  Plan of Care Reviewed With: patient, guardian  Patient Agreement with Plan of Care: agrees  Consent Given to Review Plan with: Declines family involvement.  Progress: improving  Outcome Evaluation: Therapist met with Patient one-on-one.    Problem: Adult Behavioral Health Plan of Care  Goal: Plan of Care Review  Outcome: Ongoing, Progressing  Flowsheets  Taken 5/6/2022 1501  Progress: improving  Plan of Care Reviewed With:  • patient  • guardian  Patient Agreement with Plan of Care: agrees  Outcome Evaluation: Therapist met with Patient one-on-one.  Taken 5/2/2022 0951  Consent Given to Review Plan with: Declines family involvement.  Goal: Optimized Coping Skills in Response to Life Stressors  Outcome: Ongoing, Progressing  Flowsheets (Taken 5/6/2022 1501)  Optimized Coping Skills in Response to Life Stressors: making progress toward outcome  Intervention: Promote Effective Coping Strategies  Flowsheets (Taken 5/6/2022 1501)  Supportive Measures:  • active listening utilized  • decision-making supported  • positive reinforcement provided  • verbalization of feelings encouraged  Goal: Develops/Participates in Therapeutic [place] to Support Successful Transition  Outcome: Ongoing, Progressing  Flowsheets (Taken 5/6/2022 1501)  Develops/Participates in Therapeutic [place] to Support Successful Transition: making progress toward outcome  Intervention: Foster Therapeutic [place]  Flowsheets (Taken 5/6/2022 1501)  Trust Relationship/Rapport:  • care explained  • empathic listening provided  • reassurance provided  • thoughts/feelings acknowledged  • questions answered  • choices provided  • emotional support provided  • questions encouraged  Intervention: Mutually Develop Transition Plan  Flowsheets (Taken 5/6/2022 1501)  Transition Support:  • community resources reviewed  • crisis management plan verbalized  • follow-up care discussed  • crisis management plan promoted  • follow-up care  coordinated     DATA: Therapist met with Patient individually this date. Patient agreeable to discuss current treatment progress and discharge concerns.     Attempted contact with the [facility] again on this date, no answer.     Patient states the [facility] is the only shelter he is not banned from.     Therapist spoke with Patient's fatherGera on this date per Patient's request who states he cannot come stay with him because he lives in a boarding house. Made Patient aware.     Patient states he is not agreeable to any other aftercare at this time.    CLINICAL MANUVERING/INTERVENTIONS:  Assisted Patient in processing session content; acknowledged and normalized Patient’s thoughts, feelings, and concerns by utilizing a person-centered approach in efforts to build appropriate rapport and a positive therapeutic relationship with open and honest communication. Allowed Patient to ventilate regarding current stressors and triggers for negative emotions and thoughts in a safe nonjudgmental environment with unconditional positive regard, active listening skills, and empathy.     ASSESSMENT: Patient was seen today for a follow up. He states he is still feeling about the same. He is discouraged he has nowhere to at discharge, but remains very difficult to reason with. He remains adamant he is not going to rehab. Patient states he plans to call his father tonight and speak with him.     PLAN: Patient will continue stabilization. Patient will continue to receive services offered by Treatment Team.     Attempted contact with the [facility] again on this date, no answer.    Patient is not agreeable to any other aftercare at this time.

## 2022-05-07 PROCEDURE — 99232 SBSQ HOSP IP/OBS MODERATE 35: CPT | Performed by: PSYCHIATRY & NEUROLOGY

## 2022-05-07 RX ADMIN — NITROFURANTOIN MONOHYDRATE/MACROCRYSTALLINE 100 MG: 25; 75 CAPSULE ORAL at 08:48

## 2022-05-07 RX ADMIN — TRAZODONE HYDROCHLORIDE 50 MG: 50 TABLET ORAL at 21:53

## 2022-05-07 RX ADMIN — HYDROXYZINE HYDROCHLORIDE 50 MG: 50 TABLET ORAL at 18:06

## 2022-05-07 RX ADMIN — HYDROXYZINE HYDROCHLORIDE 50 MG: 50 TABLET ORAL at 08:48

## 2022-05-07 RX ADMIN — NICOTINE TRANSDERMAL SYSTEM 1 PATCH: 21 PATCH, EXTENDED RELEASE TRANSDERMAL at 08:48

## 2022-05-07 RX ADMIN — RISPERIDONE 0.5 MG: 0.25 TABLET, FILM COATED ORAL at 21:53

## 2022-05-07 RX ADMIN — NITROFURANTOIN MONOHYDRATE/MACROCRYSTALLINE 100 MG: 25; 75 CAPSULE ORAL at 21:53

## 2022-05-07 RX ADMIN — ESCITALOPRAM 10 MG: 10 TABLET, FILM COATED ORAL at 21:53

## 2022-05-07 RX ADMIN — IBUPROFEN 400 MG: 400 TABLET, FILM COATED ORAL at 08:48

## 2022-05-07 RX ADMIN — RISPERIDONE 0.5 MG: 0.25 TABLET, FILM COATED ORAL at 08:48

## 2022-05-07 NOTE — PLAN OF CARE
Problem: Adult Behavioral Health Plan of Care  Goal: Plan of Care Review  Outcome: Ongoing, Progressing  Flowsheets  Taken 5/7/2022 0626  Progress: no change  Plan of Care Reviewed With: patient  Patient Agreement with Plan of Care: agrees  Taken 5/7/2022 0200  Plan of Care Reviewed With: patient  Patient Agreement with Plan of Care: agrees  Goal: Patient-Specific Goal (Individualization)  Outcome: Ongoing, Progressing  Goal: Adheres to Safety Considerations for Self and Others  Outcome: Ongoing, Progressing  Intervention: Develop and Maintain Individualized Safety Plan  Recent Flowsheet Documentation  Taken 5/7/2022 0600 by Marianna Head RN  Safety Measures: safety rounds completed  Taken 5/7/2022 0400 by Marianna Head RN  Safety Measures: safety rounds completed  Taken 5/7/2022 0200 by Marianna Head RN  Safety Measures: safety rounds completed  Taken 5/7/2022 0000 by Marianna Head RN  Safety Measures: safety rounds completed  Taken 5/6/2022 2200 by Marianna Head RN  Safety Measures: safety rounds completed  Taken 5/6/2022 2000 by Marianna Head RN  Safety Measures:   suicide assessment completed   safety rounds completed   environmental rounds completed   safety plan reviewed  Goal: Absence of New-Onset Illness or Injury  Outcome: Ongoing, Progressing  Intervention: Identify and Manage Fall Risk  Recent Flowsheet Documentation  Taken 5/7/2022 0600 by Marianna Head RN  Safety Measures: safety rounds completed  Taken 5/7/2022 0400 by Marianna Head RN  Safety Measures: safety rounds completed  Taken 5/7/2022 0200 by Marianna Head RN  Safety Measures: safety rounds completed  Taken 5/7/2022 0000 by Marianna Head RN  Safety Measures: safety rounds completed  Taken 5/6/2022 2200 by Marianna Head RN  Safety Measures: safety rounds completed  Taken 5/6/2022 2000 by Marianna Head  RN  Safety Measures:   suicide assessment completed   safety rounds completed   environmental rounds completed   safety plan reviewed  Goal: Optimized Coping Skills in Response to Life Stressors  Outcome: Ongoing, Progressing  Goal: Develops/Participates in Therapeutic [place] to Support Successful Transition  Outcome: Ongoing, Progressing   Goal Outcome Evaluation:  Plan of Care Reviewed With: patient  Patient Agreement with Plan of Care: agrees     Progress: no change

## 2022-05-07 NOTE — PROGRESS NOTES
"INPATIENT PSYCHIATRIC PROGRESS NOTE    Name:  Gera Stern  :  1975  MRN:  [id number]  Visit Number:  [id number]  Length of stay:  5    SUBJECTIVE  CC/Focus of Exam: depression, SI, psychosis    INTERVAL HISTORY:  First time seeing patient.  Chart, notes, vitals, labs and EKG personally reviewed.    The patient states is still depressed and intermittently suicidal.  He reports that he could stay with his father, but father would charge them $300 a month for rent and he does not have that money.  Discussed rehab as an option, but he appears to have poor insight in regard to substance abuse.    Depression rating 9/10  Anxiety rating 9/10  Sleep: not good  Withdrawal sx: denies  Cravin/10    Review of Systems   Respiratory: Negative.    Cardiovascular: Negative.    Gastrointestinal: Negative.    Psychiatric/Behavioral: Positive for dysphoric mood and suicidal ideas. The patient is nervous/anxious.        OBJECTIVE    Temp:  [97.9 °F (36.6 °C)-99.3 °F (37.4 °C)] 98.3 °F (36.8 °C)  Heart Rate:  [74-97] 79  Resp:  [16-18] 18  BP: (103-119)/(60-90) 116/81    MENTAL STATUS EXAM:  Appearance:Casually dressed, good hygeine.   Cooperation:Cooperative  Psychomotor: No psychomotor agitation/retardation, No EPS, No motor tics  Speech-normal rate, amount.  Mood \"depressed\"   Affect-congruent, appropriate, stable  Thought Content-goal directed, no delusional material present  Thought process-linear, organized.  Suicidality: + SI  Homicidality: No HI  Perception: No AH/VH  Insight-fair   Judgement-fair    Lab Results (last 24 hours)     ** No results found for the last 24 hours. **             Imaging Results (Last 24 Hours)     ** No results found for the last 24 hours. **             ECG/EMG Results (most recent)     Procedure Component Value Units Date/Time    ECG 12 Lead [[id number]] Collected: 22 1546     Updated: 22 1046     QT Interval 412 ms      QTC Interval 457 ms     Narrative:      Test Reason : " Baseline Cardiac Status  Blood Pressure :   */*   mmHG  Vent. Rate :  74 BPM     Atrial Rate :  74 BPM     P-R Int : 130 ms          QRS Dur :  76 ms      QT Int : 412 ms       P-R-T Axes :  61  35  64 degrees     QTc Int : 457 ms    Normal sinus rhythm  Normal ECG  No previous ECGs available  Confirmed by Andrew Reddy ([id number]) on 5/3/2022 10:46:06 AM    Referred By: NATHAN           Confirmed By: Andrew Reddy           ALLERGIES: Bactrim [sulfamethoxazole-trimethoprim]      Current Facility-Administered Medications:   •  acetaminophen (TYLENOL) tablet 650 mg, 650 mg, Oral, Q6H PRN, Frederick Austin MD, 650 mg at 05/03/22 1034  •  aluminum-magnesium hydroxide-simethicone (MAALOX MAX) 400-400-40 MG/5ML suspension 15 mL, 15 mL, Oral, Q6H PRN, Frederick Austin MD  •  benzonatate (TESSALON) capsule 100 mg, 100 mg, Oral, TID PRN, Frederick Austin MD  •  benztropine (COGENTIN) tablet 2 mg, 2 mg, Oral, Once PRN **OR** benztropine (COGENTIN) injection 1 mg, 1 mg, Intramuscular, Once PRN, Frederick Austin MD  •  escitalopram (LEXAPRO) tablet 10 mg, 10 mg, Oral, Nightly, Alejandro Coppola MD, 10 mg at 05/06/22 2126  •  famotidine (PEPCID) tablet 20 mg, 20 mg, Oral, BID PRN, Frederick Austin MD  •  hydrOXYzine (ATARAX) tablet 50 mg, 50 mg, Oral, Q6H PRN, Frederick Austin MD, 50 mg at 05/07/22 0848  •  ibuprofen (ADVIL,MOTRIN) tablet 400 mg, 400 mg, Oral, Q6H PRN, Frederick Austin MD, 400 mg at 05/07/22 0848  •  loperamide (IMODIUM) capsule 2 mg, 2 mg, Oral, Q2H PRN, Frederick Austin MD  •  magnesium hydroxide (MILK OF MAGNESIA) suspension 10 mL, 10 mL, Oral, Daily PRN, Frederick Austin MD  •  nicotine (NICODERM CQ) 21 MG/24HR patch 1 patch, 1 patch, Transdermal, Q24H, Frederick Austin MD, 1 patch at 05/07/22 0848  •  nitrofurantoin (macrocrystal-monohydrate) (MACROBID) capsule 100 mg, 100 mg, Oral, BID, Frederick Austin MD, 100 mg at 05/07/22 0848  •  ondansetron (ZOFRAN) tablet 4 mg, 4 mg, Oral, Q6H PRN, Frederick Austin MD  •  risperiDONE  (risperDAL) tablet 0.5 mg, 0.5 mg, Oral, Q12H, Alejandro Coppola MD, 0.5 mg at 05/07/22 0848  •  sodium chloride nasal spray 2 spray, 2 spray, Each Nare, PRN, Frederick Austin MD  •  traZODone (DESYREL) tablet 50 mg, 50 mg, Oral, Nightly PRN, Frederick Austin MD, 50 mg at 05/06/22 2045    ASSESSMENT & PLAN:     Suicidal ideation  Unspecified Depressive Disorder  Psychosis  - Continue lexapro 10 mg QHS  - Continue risperidone 0.5 mg bid     Amphetamine, Opiate, and Cannabis Use Disorder, Moderate  - Patient denies heavy use, stating that he does not think that he is at risk for withdrawal.  Denies withdrawal symptoms at this time.  - Will monitor for withdrawals and treat accordingly  - Clonidine detox prn     Elevated WBC, elevated platelet count  - May be due to dehydration  - Recheck shows improvement    Special precautions: Special Precautions Level 3 (q15 min checks) .    Behavioral Health Treatment Plan and Problem List: I have reviewed and approved the Behavioral Health Treatment Plan and Problem list.  The patient has had a chance to review and agrees with the treatment plan.     Clinician:  Joni Blackmon MD  05/07/22  11:34 EDT

## 2022-05-08 PROCEDURE — 99232 SBSQ HOSP IP/OBS MODERATE 35: CPT | Performed by: PSYCHIATRY & NEUROLOGY

## 2022-05-08 RX ADMIN — RISPERIDONE 0.5 MG: 0.25 TABLET, FILM COATED ORAL at 09:14

## 2022-05-08 RX ADMIN — IBUPROFEN 400 MG: 400 TABLET, FILM COATED ORAL at 21:41

## 2022-05-08 RX ADMIN — NITROFURANTOIN MONOHYDRATE/MACROCRYSTALLINE 100 MG: 25; 75 CAPSULE ORAL at 21:40

## 2022-05-08 RX ADMIN — RISPERIDONE 0.5 MG: 0.25 TABLET, FILM COATED ORAL at 21:40

## 2022-05-08 RX ADMIN — TRAZODONE HYDROCHLORIDE 50 MG: 50 TABLET ORAL at 21:40

## 2022-05-08 RX ADMIN — ESCITALOPRAM 10 MG: 10 TABLET, FILM COATED ORAL at 21:40

## 2022-05-08 RX ADMIN — NITROFURANTOIN MONOHYDRATE/MACROCRYSTALLINE 100 MG: 25; 75 CAPSULE ORAL at 09:14

## 2022-05-08 NOTE — PLAN OF CARE
Goal Outcome Evaluation:  Plan of Care Reviewed With: patient  Patient Agreement with Plan of Care: agrees        Pt states anxiety 7, depression 3. He is calm and cooperative with staff, med compliant.

## 2022-05-08 NOTE — PROGRESS NOTES
"INPATIENT PSYCHIATRIC PROGRESS NOTE    Name:  Gera Stern  :  1975  MRN:  [id number]  Visit Number:  [id number]  Length of stay:  6    SUBJECTIVE  CC/Focus of Exam: depression, SI, psychosis    INTERVAL HISTORY:  Patient reports continued depression, but SI improving.  Anxiety about discharge disposition, but after discussion about rehab options, patient said that he would prefer to return back to previous rehab, [facility] in [facility].  No acute complaints today.    Depression rating 6/10  Anxiety rating 4/10  Sleep: Fair  Withdrawal sx: denies  Cravin/10    Review of Systems   Respiratory: Negative.    Cardiovascular: Negative.    Gastrointestinal: Negative.    Psychiatric/Behavioral: Positive for dysphoric mood. The patient is nervous/anxious.        OBJECTIVE    Temp:  [97.9 °F (36.6 °C)-98.6 °F (37 °C)] 97.9 °F (36.6 °C)  Heart Rate:  [69-91] 85  Resp:  [18] 18  BP: (103-146)/(63-88) 107/63    MENTAL STATUS EXAM:  Appearance:Casually dressed, good hygeine.   Cooperation:Cooperative  Psychomotor: No psychomotor agitation/retardation, No EPS, No motor tics  Speech-normal rate, amount.  Mood \"a little better\"   Affect-congruent, appropriate, stable  Thought Content-goal directed, no delusional material present  Thought process-linear, organized.  Suicidality: Denies SI  Homicidality: No HI  Perception: No AH/VH  Insight-fair   Judgement-fair    Lab Results (last 24 hours)     ** No results found for the last 24 hours. **             Imaging Results (Last 24 Hours)     ** No results found for the last 24 hours. **             ECG/EMG Results (most recent)     Procedure Component Value Units Date/Time    ECG 12 Lead [[id number]] Collected: 22 1546     Updated: 22 1046     QT Interval 412 ms      QTC Interval 457 ms     Narrative:      Test Reason : Baseline Cardiac Status  Blood Pressure :   */*   mmHG  Vent. Rate :  74 BPM     Atrial Rate :  74 BPM     P-R Int : 130 ms          QRS " Dur :  76 ms      QT Int : 412 ms       P-R-T Axes :  61  35  64 degrees     QTc Int : 457 ms    Normal sinus rhythm  Normal ECG  No previous ECGs available  Confirmed by Andrew Reddy ([id number]) on 5/3/2022 10:46:06 AM    Referred By: NATHAN           Confirmed By: Andrew Reddy           ALLERGIES: Bactrim [sulfamethoxazole-trimethoprim]      Current Facility-Administered Medications:   •  acetaminophen (TYLENOL) tablet 650 mg, 650 mg, Oral, Q6H PRN, Frederick Austin MD, 650 mg at 05/03/22 1034  •  aluminum-magnesium hydroxide-simethicone (MAALOX MAX) 400-400-40 MG/5ML suspension 15 mL, 15 mL, Oral, Q6H PRN, Frederick Austin MD  •  benzonatate (TESSALON) capsule 100 mg, 100 mg, Oral, TID PRN, Frederick Austin MD  •  benztropine (COGENTIN) tablet 2 mg, 2 mg, Oral, Once PRN **OR** benztropine (COGENTIN) injection 1 mg, 1 mg, Intramuscular, Once PRN, Frederick Austin MD  •  escitalopram (LEXAPRO) tablet 10 mg, 10 mg, Oral, Nightly, Alejandro Coppola MD, 10 mg at 05/07/22 2153  •  famotidine (PEPCID) tablet 20 mg, 20 mg, Oral, BID PRN, Frederick Austin MD  •  hydrOXYzine (ATARAX) tablet 50 mg, 50 mg, Oral, Q6H PRN, Frederick Austin MD, 50 mg at 05/07/22 1806  •  ibuprofen (ADVIL,MOTRIN) tablet 400 mg, 400 mg, Oral, Q6H PRN, Frederick Austin MD, 400 mg at 05/07/22 0848  •  loperamide (IMODIUM) capsule 2 mg, 2 mg, Oral, Q2H PRN, Frederick Austin MD  •  magnesium hydroxide (MILK OF MAGNESIA) suspension 10 mL, 10 mL, Oral, Daily PRN, Frederick Austin MD  •  nicotine (NICODERM CQ) 21 MG/24HR patch 1 patch, 1 patch, Transdermal, Q24H, Frederick Austin MD, 1 patch at 05/07/22 0848  •  nitrofurantoin (macrocrystal-monohydrate) (MACROBID) capsule 100 mg, 100 mg, Oral, BID, Frederick Austin MD, 100 mg at 05/08/22 0914  •  ondansetron (ZOFRAN) tablet 4 mg, 4 mg, Oral, Q6H PRN, Frederick Austin MD  •  risperiDONE (risperDAL) tablet 0.5 mg, 0.5 mg, Oral, Q12H, Alejandro Coppola MD, 0.5 mg at 05/08/22 0914  •  sodium chloride nasal spray 2 spray, 2 spray,  Each Lucas, Geovanna NELSON Luis, MD  •  traZODone (DESYREL) tablet 50 mg, 50 mg, Oral, Nightly Geovanna NELSON Luis, MD, 50 mg at 05/07/22 8283    ASSESSMENT & PLAN:     Suicidal ideation  Unspecified Depressive Disorder  Psychosis  - Continue lexapro 10 mg QHS  - Continue risperidone 0.5 mg bid     Amphetamine, Opiate, and Cannabis Use Disorder, Moderate  - Patient denies heavy use, stating that he does not think that he is at risk for withdrawal.  Denies withdrawal symptoms at this time.  - Will monitor for withdrawals and treat accordingly  -Patient reports that he plans to return to [facility] rehab following hospitalization     Elevated WBC, elevated platelet count  - May be due to dehydration  - Recheck shows improvement    Special precautions: Special Precautions Level 3 (q15 min checks) .    Behavioral Health Treatment Plan and Problem List: I have reviewed and approved the Behavioral Health Treatment Plan and Problem list.  The patient has had a chance to review and agrees with the treatment plan.     Clinician:  Joni Blackmon MD  05/08/22  10:12 EDT

## 2022-05-08 NOTE — PLAN OF CARE
Problem: Adult Behavioral Health Plan of Care  Goal: Plan of Care Review  Outcome: Ongoing, Progressing  Flowsheets  Taken 5/8/2022 1412  Progress: no change  Outcome Evaluation: NO CHANGE IN PATIENT CONDITION NOTED. NO S/S OF ACUTE DISTRESS NOTED.  Taken 5/8/2022 0755  Plan of Care Reviewed With: patient  Patient Agreement with Plan of Care: agrees   Goal Outcome Evaluation:  Plan of Care Reviewed With: patient  Patient Agreement with Plan of Care: agrees     Progress: no change  Outcome Evaluation: NO CHANGE IN PATIENT CONDITION NOTED. NO S/S OF ACUTE DISTRESS NOTED.

## 2022-05-09 PROCEDURE — 99232 SBSQ HOSP IP/OBS MODERATE 35: CPT | Performed by: PSYCHIATRY & NEUROLOGY

## 2022-05-09 RX ADMIN — TRAZODONE HYDROCHLORIDE 50 MG: 50 TABLET ORAL at 21:00

## 2022-05-09 RX ADMIN — ESCITALOPRAM 10 MG: 10 TABLET, FILM COATED ORAL at 21:00

## 2022-05-09 RX ADMIN — IBUPROFEN 400 MG: 400 TABLET, FILM COATED ORAL at 21:00

## 2022-05-09 RX ADMIN — NICOTINE TRANSDERMAL SYSTEM 1 PATCH: 21 PATCH, EXTENDED RELEASE TRANSDERMAL at 10:11

## 2022-05-09 RX ADMIN — RISPERIDONE 0.5 MG: 0.25 TABLET, FILM COATED ORAL at 10:12

## 2022-05-09 RX ADMIN — RISPERIDONE 0.5 MG: 0.25 TABLET, FILM COATED ORAL at 21:00

## 2022-05-09 NOTE — PLAN OF CARE
Goal Outcome Evaluation:  Plan of Care Reviewed With: patient  Patient Agreement with Plan of Care: agrees     Progress: improving   Patient reports anxiety 10 and depression 10 on a 1/10 scale. Patient voiced several times during assessment that he is worried about getting discharged. Stated that someone mentioned him being able to leave today and does not feel comfortable with that decision. Denies SI/HI/AVH. Patient slept well through night with no awakenings and voiced that his eating has improved since being in the facility.

## 2022-05-09 NOTE — PLAN OF CARE
Goal Outcome Evaluation:  Plan of Care Reviewed With: patient, guardian  Patient Agreement with Plan of Care: agrees  Consent Given to Review Plan with: Declines family involvement.  Progress: improving  Outcome Evaluation: Therapist met with Patient one-on-one.    Problem: Adult Behavioral Health Plan of Care  Goal: Plan of Care Review  Outcome: Ongoing, Progressing  Flowsheets  Taken 5/9/2022 0943  Progress: improving  Plan of Care Reviewed With:   patient   guardian  Patient Agreement with Plan of Care: agrees  Outcome Evaluation: Therapist met with Patient one-on-one.  Taken 5/2/2022 0951  Consent Given to Review Plan with: Declines family involvement.  Goal: Optimized Coping Skills in Response to Life Stressors  Outcome: Ongoing, Progressing  Flowsheets (Taken 5/9/2022 0943)  Optimized Coping Skills in Response to Life Stressors: making progress toward outcome  Intervention: Promote Effective Coping Strategies  Flowsheets (Taken 5/9/2022 0943)  Supportive Measures:   active listening utilized   decision-making supported   positive reinforcement provided   verbalization of feelings encouraged  Goal: Develops/Participates in Therapeutic [place] to Support Successful Transition  Outcome: Ongoing, Progressing  Flowsheets (Taken 5/9/2022 0943)  Develops/Participates in Therapeutic [place] to Support Successful Transition: making progress toward outcome  Intervention: Foster Therapeutic [place]  Flowsheets (Taken 5/9/2022 0943)  Trust Relationship/Rapport:   care explained   questions encouraged   reassurance provided   choices provided   emotional support provided   thoughts/feelings acknowledged   empathic listening provided   questions answered  Intervention: Mutually Develop Transition Plan  Flowsheets (Taken 5/9/2022 0943)  Transition Support:   community resources reviewed   follow-up care coordinated   crisis management plan promoted   follow-up care discussed   crisis management plan verbalized     DATA:  Therapist met with Patient individually this date. Patient agreeable to discuss current treatment progress and discharge concerns.     Patient signed for [facility]. Spoke with [facility] who states they can accept Patient tomorrow, 5/10 and request he arrive between 12:00-1:00 PM.     CLINICAL MANUVERING/INTERVENTIONS:  Assisted Patient in processing session content; acknowledged and normalized Patient’s thoughts, feelings, and concerns by utilizing a person-centered approach in efforts to build appropriate rapport and a positive therapeutic relationship with open and honest communication. Allowed Patient to ventilate regarding current stressors and triggers for negative emotions and thoughts in a safe nonjudgmental environment with unconditional positive regard, active listening skills, and empathy.     ASSESSMENT: Patient was seen today for a follow up. Reports improvement in mood and anxiety. He states he did some thinking over the weekend and decided it is probably best he go to rehab and have them assist with more permanent housing arrangements once he completes the program. Patient appeared goal and future oriented today, which is a major improvement from last week.     PLAN: Patient will continue stabilization. Patient will continue to receive services offered by Treatment Team.     Patient will be discharged to [facility]. Assistance with transportation will be needed.

## 2022-05-09 NOTE — PROGRESS NOTES
"INPATIENT PSYCHIATRIC PROGRESS NOTE    Name:  Gera Stern  :  1975  MRN:  [id number]  Visit Number:  [id number]  Length of stay:  7    SUBJECTIVE  CC/Focus of Exam: depression, SI, psychosis    INTERVAL HISTORY:  The patient states he is feeling some better and has realized he has to go to rehab. States he is feels happy one moment and the next moment he is very sad with racing thoughts. He wants to be on disability. He reports ongoing suicidal thoughts.   Depression rating 10/10  Anxiety rating 10/10  Sleep: not good  Withdrawal sx: denies  Cravin/10    Review of Systems   Respiratory: Negative.    Cardiovascular: Negative.    Gastrointestinal: Negative.    Psychiatric/Behavioral: Positive for dysphoric mood and suicidal ideas. The patient is nervous/anxious.        OBJECTIVE    Temp:  [97.6 °F (36.4 °C)-99 °F (37.2 °C)] 97.6 °F (36.4 °C)  Heart Rate:  [76-87] 87  Resp:  [18] 18  BP: (129-137)/(80-81) 137/81    MENTAL STATUS EXAM:  Appearance:Casually dressed, good hygeine.   Cooperation:Cooperative  Psychomotor: No psychomotor agitation/retardation, No EPS, No motor tics  Speech-normal rate, amount.  Mood \"depressed and anxious\"   Affect-congruent, appropriate, stable  Thought Content-goal directed, no delusional material present  Thought process-linear, organized.  Suicidality: + SI  Homicidality: No HI  Perception: No AH/VH  Insight-fair   Judgement-fair    Lab Results (last 24 hours)     ** No results found for the last 24 hours. **             Imaging Results (Last 24 Hours)     ** No results found for the last 24 hours. **             ECG/EMG Results (most recent)     Procedure Component Value Units Date/Time    ECG 12 Lead [[id number]] Collected: 22 1546     Updated: 22 1046     QT Interval 412 ms      QTC Interval 457 ms     Narrative:      Test Reason : Baseline Cardiac Status  Blood Pressure :   */*   mmHG  Vent. Rate :  74 BPM     Atrial Rate :  74 BPM     P-R Int : 130 ms        "   QRS Dur :  76 ms      QT Int : 412 ms       P-R-T Axes :  61  35  64 degrees     QTc Int : 457 ms    Normal sinus rhythm  Normal ECG  No previous ECGs available  Confirmed by Andrew Reddy ([id number]) on 5/3/2022 10:46:06 AM    Referred By: NATHAN           Confirmed By: Andrew Reddy           ALLERGIES: Bactrim [sulfamethoxazole-trimethoprim]      Current Facility-Administered Medications:   •  acetaminophen (TYLENOL) tablet 650 mg, 650 mg, Oral, Q6H PRN, Frederick Austin MD, 650 mg at 05/03/22 1034  •  aluminum-magnesium hydroxide-simethicone (MAALOX MAX) 400-400-40 MG/5ML suspension 15 mL, 15 mL, Oral, Q6H PRN, Frederick Austin MD  •  benzonatate (TESSALON) capsule 100 mg, 100 mg, Oral, TID PRN, Frederick Austin MD  •  benztropine (COGENTIN) tablet 2 mg, 2 mg, Oral, Once PRN **OR** benztropine (COGENTIN) injection 1 mg, 1 mg, Intramuscular, Once PRN, Frederick Austin MD  •  escitalopram (LEXAPRO) tablet 10 mg, 10 mg, Oral, Nightly, Alejandro Coppola MD, 10 mg at 05/08/22 2140  •  famotidine (PEPCID) tablet 20 mg, 20 mg, Oral, BID PRN, Frederick Austin MD  •  hydrOXYzine (ATARAX) tablet 50 mg, 50 mg, Oral, Q6H PRN, Frederick Austin MD, 50 mg at 05/07/22 1806  •  ibuprofen (ADVIL,MOTRIN) tablet 400 mg, 400 mg, Oral, Q6H PRN, Frederick Austin MD, 400 mg at 05/08/22 2141  •  loperamide (IMODIUM) capsule 2 mg, 2 mg, Oral, Q2H PRN, Frederick Austin MD  •  magnesium hydroxide (MILK OF MAGNESIA) suspension 10 mL, 10 mL, Oral, Daily PRN, Frederick Austin MD  •  nicotine (NICODERM CQ) 21 MG/24HR patch 1 patch, 1 patch, Transdermal, Q24H, Frederick Austin MD, 1 patch at 05/09/22 1011  •  ondansetron (ZOFRAN) tablet 4 mg, 4 mg, Oral, Q6H PRN, Frederick Austin MD  •  risperiDONE (risperDAL) tablet 0.5 mg, 0.5 mg, Oral, Q12H, Alejandro Coppola MD, 0.5 mg at 05/09/22 1012  •  sodium chloride nasal spray 2 spray, 2 spray, Each Nare, PRN, Frederick Austin MD  •  traZODone (DESYREL) tablet 50 mg, 50 mg, Oral, Nightly PRN, Frederick Austin MD, 50 mg at  05/08/22 2140    ASSESSMENT & PLAN:    Suicidal ideation  Unspecified Depressive Disorder  Psychosis  - Continue lexapro 10 mg QHS  - Conitnue risperidone 0.5 mg bid     Amphetamine, Opiate, and Cannabis Use Disorder, Moderate  - Patient denies heavy use, stating that he does not think that he is at risk for withdrawal.  Denies withdrawal symptoms at this time.  - Will monitor for withdrawals and treat accordingly  - Clonidine detox prn     Elevated WBC, elevated platelet count  - May be due to dehydration  - Recheck shows improvement    Special precautions: Special Precautions Level 3 (q15 min checks) .    Behavioral Health Treatment Plan and Problem List: I have reviewed and approved the Behavioral Health Treatment Plan and Problem list.  The patient has had a chance to review and agrees with the treatment plan.     Clinician:  Alejandro Coppola MD  05/09/22  13:02 EDT

## 2022-05-09 NOTE — PLAN OF CARE
Problem: Adult Behavioral Health Plan of Care  Goal: Plan of Care Review  Outcome: Ongoing, Progressing  Flowsheets  Taken 5/9/2022 1424 by Jovanni Guerrero, RN  Progress: no change  Outcome Evaluation: NO CHANGES OR S/S OF ACUTE DISTRESS NOTED.  Taken 5/9/2022 0943 by Clare Hill MSW  Plan of Care Reviewed With:   patient   guardian  Patient Agreement with Plan of Care: agrees  Taken 5/9/2022 0719 by Jovanni Guerrero, RN  Plan of Care Reviewed With: patient  Patient Agreement with Plan of Care: agrees   Goal Outcome Evaluation:  Plan of Care Reviewed With: patient  Patient Agreement with Plan of Care: agrees     Progress: no change  Outcome Evaluation: NO CHANGES OR S/S OF ACUTE DISTRESS NOTED.

## 2022-05-09 NOTE — PROGRESS NOTES
Navigator is helping Primary Therapist with discharge planning for patient. Navigator contacted [facility] and spoke with Brenda. They are willing to accept patient tomorrow, May 10. Patient will need to arrive early afternoon. Brenda to send over a bed letter so public transportation can be arranged.   [facility] - [phone]            [facility] They will arrive around 10:00am for  tomorrow.   [facility] - [phone]

## 2022-05-10 VITALS
BODY MASS INDEX: 24.71 KG/M2 | TEMPERATURE: 97.9 F | RESPIRATION RATE: 18 BRPM | SYSTOLIC BLOOD PRESSURE: 122 MMHG | HEART RATE: 78 BPM | OXYGEN SATURATION: 100 % | WEIGHT: 163 LBS | DIASTOLIC BLOOD PRESSURE: 78 MMHG | HEIGHT: 68 IN

## 2022-05-10 PROCEDURE — 99238 HOSP IP/OBS DSCHRG MGMT 30/<: CPT | Performed by: PSYCHIATRY & NEUROLOGY

## 2022-05-10 RX ORDER — ESCITALOPRAM OXALATE 10 MG/1
10 TABLET ORAL NIGHTLY
Qty: 30 TABLET | Refills: 0 | Status: SHIPPED | OUTPATIENT
Start: 2022-05-10

## 2022-05-10 RX ORDER — RISPERIDONE 0.5 MG/1
0.5 TABLET ORAL EVERY 12 HOURS SCHEDULED
Qty: 60 TABLET | Refills: 0 | Status: SHIPPED | OUTPATIENT
Start: 2022-05-10

## 2022-05-10 NOTE — PAYOR COMM NOTE
"Evelin Carter (47 y.o. Male)             Date of Birth   1975    Social Security Number       Address   [address] [place] [address]    Home Phone   [phone]    MRN   [id number]       Nondenominational   None    Marital Status   Single                            Admission Date   22    Admission Type   Urgent    Admitting Provider   Frederick Austin MD    Attending Provider       Department, Room/Bed   [facility] ADULT PSYCHIATRIC, 1020/2S       Discharge Date   5/10/2022    Discharge Disposition   Home or Self Care    Discharge Destination                               Attending Provider: (none)   Allergies: Bactrim [Sulfamethoxazole-trimethoprim]    Isolation: None   Infection: None   Code Status: CPR   Advance Care Planning Activity    Ht: 172.7 cm (68\")   Wt: 73.9 kg (163 lb)    Admission Cmt: None   Principal Problem: None                Active Insurance as of 2022     Primary Coverage     Payor Plan Insurance Group Employer/Plan Group    [place] [id number]     Payor Plan Address Payor Plan Phone Number Payor Plan Fax Number Effective Dates    [employer] [address] [address] [phone]  2/10/2020 - None Entered    [place] [address]       Subscriber Name Subscriber Birth Date Member ID       EVELIN CARTER 1975 [id number]                 Emergency Contacts      (Rel.) Home Phone Work Phone Mobile Phone    EVELIN CARTER (Father) [phone] -- --    HONEYSHREYAS BRUNNER (Friend) [phone] -- --          PLEASE ATTACH THIS DISCHARGE INFORMATION TO AUTH. # [id number].    DISCHARGE DATE: 05/10/2022.      FOLLOW UP:  [facility]   [address], [place] [id number]  [phone]     May 10 2022          Discharge Summary      Alejandro Coppola MD at 05/10/22 0927          :  1975  MRN:  [id number]  Visit Number:  [id number]      Date of Admission:2022   Date of Discharge:  5/10/2022    Discharge " "Diagnosis:  Active Problems:    Suicidal ideation        Admission Diagnosis:  Suicidal ideation [R45.851]     GRACIELA Stern is a 47 y.o. male who was admitted on 05- and evaluated on 05-  with complaints of suicidal.  For details please see H&P dated 5/2/22.    Hospital Course  Patient is a 47 y.o. male presented with suicidal ideations. The patient was admitted to the [facility] AE1 unit for safety, further evaluation and treatment.  The patient was started on escitalopram and risperidone was added. He wanted to be started back on Thorazine as he reported it helped with anxiety in the past but his QTc was slightly prolonged and Thorazine was not started.   The patient was also able to take part in individual and group counseling sessions and work on appropriate coping skills. The patient wanted help with getting on disability. He wanted it for his back problem. He was encouraged to abstain from illicit substances and establish care in an outpatient setting and then pursue the disability.   The patient reported feeling hopeless and suicidal for the most part and there appeared to be an element of exaggeration of symptoms and when he decided to leave he had rapid improvement in his mood and he expressed feeling more positive and hopeful about future. Sleep and appetite were improved.  The day of discharge the patient was calm, cooperative and pleasant. Mood was reported to be good, and denied SI/HI/AVH. Also reported no medication side effects.        Mental Status Exam upon discharge:   Mood \"good\"   Affect-congruent, appropriate, stable  Thought Content-goal directed, no delusional material present  Thought process-linear, organized.  Suicidality: No SI  Homicidality: No HI  Perception: No /    Procedures Performed         Consults:   Consults     No orders found from 4/3/2022 to 5/3/2022.          Pertinent Test Results:   Admission on 05/02/2022   Component Date Value Ref Range Status "   • QT Interval 05/02/2022 412  ms Final   • QTC Interval 05/02/2022 457  ms Final   • WBC 05/03/2022 11.14 (A) 3.40 - 10.80 10*3/mm3 Final   • RBC 05/03/2022 4.67  4.14 - 5.80 10*6/mm3 Final   • Hemoglobin 05/03/2022 14.2  13.0 - 17.7 g/dL Final   • Hematocrit 05/03/2022 43.6  37.5 - 51.0 % Final   • MCV 05/03/2022 93.4  79.0 - 97.0 fL Final   • MCH 05/03/2022 30.4  26.6 - 33.0 pg Final   • MCHC 05/03/2022 32.6  31.5 - 35.7 g/dL Final   • RDW 05/03/2022 14.3  12.3 - 15.4 % Final   • RDW-SD 05/03/2022 49.1  37.0 - 54.0 fl Final   • MPV 05/03/2022 10.1  6.0 - 12.0 fL Final   • Platelets 05/03/2022 592 (A) 140 - 450 10*3/mm3 Final   Admission on 05/01/2022, Discharged on 05/02/2022   Component Date Value Ref Range Status   • Extra Tube 05/01/2022 Hold for add-ons.   Final    Auto resulted.   • Extra Tube 05/01/2022 hold for add-on   Final    Auto resulted   • Extra Tube 05/01/2022 Hold for add-ons.   Final    Auto resulted.   • Extra Tube 05/01/2022 hold for add-on   Final    Auto resulted   • Glucose 05/01/2022 99  65 - 99 mg/dL Final   • BUN 05/01/2022 23 (A) 6 - 20 mg/dL Final   • Creatinine 05/01/2022 1.08  0.76 - 1.27 mg/dL Final   • Sodium 05/01/2022 138  136 - 145 mmol/L Final   • Potassium 05/01/2022 4.1  3.5 - 5.2 mmol/L Final   • Chloride 05/01/2022 100  98 - 107 mmol/L Final   • CO2 05/01/2022 22.7  22.0 - 29.0 mmol/L Final   • Calcium 05/01/2022 9.9  8.6 - 10.5 mg/dL Final   • Total Protein 05/01/2022 8.3  6.0 - 8.5 g/dL Final   • Albumin 05/01/2022 4.70  3.50 - 5.20 g/dL Final   • ALT (SGPT) 05/01/2022 38  1 - 41 U/L Final   • AST (SGOT) 05/01/2022 28  1 - 40 U/L Final   • Alkaline Phosphatase 05/01/2022 107  39 - 117 U/L Final   • Total Bilirubin 05/01/2022 0.7  0.0 - 1.2 mg/dL Final   • Globulin 05/01/2022 3.6  gm/dL Final   • A/G Ratio 05/01/2022 1.3  g/dL Final   • BUN/Creatinine Ratio 05/01/2022 21.3  7.0 - 25.0 Final   • Anion Gap 05/01/2022 15.3 (A) 5.0 - 15.0 mmol/L Final   • eGFR 05/01/2022 85.2   >60.0 mL/min/1.73 Final    National Kidney Foundation and American Society of Nephrology (ASN) Task Force recommended calculation based on the Chronic Kidney Disease Epidemiology Collaboration (CKD-EPI) equation refit without adjustment for race.   • COVID19 05/01/2022 Not Detected  Not Detected - Ref. Range Final   • Color, UA 05/01/2022 Yellow  Yellow, Straw Final   • Appearance, UA 05/01/2022 Clear  Clear Final   • pH, UA 05/01/2022 <=5.0  5.0 - 8.0 Final   • Specific Gravity, UA 05/01/2022 >=1.030  1.005 - 1.030 Final   • Glucose, UA 05/01/2022 Negative  Negative Final   • Ketones, UA 05/01/2022 40 mg/dL (2+) (A) Negative Final   • Bilirubin, UA 05/01/2022 Negative  Negative Final   • Blood, UA 05/01/2022 Negative  Negative Final   • Protein, UA 05/01/2022 30 mg/dL (1+) (A) Negative Final   • Leuk Esterase, UA 05/01/2022 Negative  Negative Final   • Nitrite, UA 05/01/2022 Negative  Negative Final   • Urobilinogen, UA 05/01/2022 0.2 E.U./dL  0.2 - 1.0 E.U./dL Final   • Acetaminophen 05/01/2022 <5.0  0.0 - 30.0 mcg/mL Final   • Ethanol 05/01/2022 <10  0 - 10 mg/dL Final   • Ethanol % 05/01/2022 <0.010  % Final   • THC, Screen, Urine 05/01/2022 Positive (A) Negative Final   • Phencyclidine (PCP), Urine 05/01/2022 Negative  Negative Final   • Cocaine Screen, Urine 05/01/2022 Negative  Negative Final   • Methamphetamine, Ur 05/01/2022 Positive (A) Negative Final   • Opiate Screen 05/01/2022 Positive (A) Negative Final   • Amphetamine Screen, Urine 05/01/2022 Positive (A) Negative Final   • Benzodiazepine Screen, Urine 05/01/2022 Negative  Negative Final   • Tricyclic Antidepressants Screen 05/01/2022 Negative  Negative Final   • Methadone Screen, Urine 05/01/2022 Negative  Negative Final   • Barbiturates Screen, Urine 05/01/2022 Negative  Negative Final   • Oxycodone Screen, Urine 05/01/2022 Negative  Negative Final   • Propoxyphene Screen 05/01/2022 Negative  Negative Final   • Buprenorphine, Screen, Urine  05/01/2022 Negative  Negative Final   • Salicylate 05/01/2022 <0.3  <=30.0 mg/dL Final   • Magnesium 05/01/2022 2.3  1.6 - 2.6 mg/dL Final   • WBC 05/01/2022 18.17 (A) 3.40 - 10.80 10*3/mm3 Final   • RBC 05/01/2022 4.88  4.14 - 5.80 10*6/mm3 Final   • Hemoglobin 05/01/2022 15.4  13.0 - 17.7 g/dL Final   • Hematocrit 05/01/2022 44.3  37.5 - 51.0 % Final   • MCV 05/01/2022 90.8  79.0 - 97.0 fL Final   • MCH 05/01/2022 31.6  26.6 - 33.0 pg Final   • MCHC 05/01/2022 34.8  31.5 - 35.7 g/dL Final   • RDW 05/01/2022 14.3  12.3 - 15.4 % Final   • RDW-SD 05/01/2022 47.6  37.0 - 54.0 fl Final   • MPV 05/01/2022 9.4  6.0 - 12.0 fL Final   • Platelets 05/01/2022 617 (A) 140 - 450 10*3/mm3 Final   • Neutrophil % 05/01/2022 72.5  42.7 - 76.0 % Final   • Lymphocyte % 05/01/2022 15.6 (A) 19.6 - 45.3 % Final   • Monocyte % 05/01/2022 9.8  5.0 - 12.0 % Final   • Eosinophil % 05/01/2022 0.2 (A) 0.3 - 6.2 % Final   • Basophil % 05/01/2022 1.5  0.0 - 1.5 % Final   • Immature Grans % 05/01/2022 0.4  0.0 - 0.5 % Final   • Neutrophils, Absolute 05/01/2022 13.18 (A) 1.70 - 7.00 10*3/mm3 Final   • Lymphocytes, Absolute 05/01/2022 2.83  0.70 - 3.10 10*3/mm3 Final   • Monocytes, Absolute 05/01/2022 1.78 (A) 0.10 - 0.90 10*3/mm3 Final   • Eosinophils, Absolute 05/01/2022 0.03  0.00 - 0.40 10*3/mm3 Final   • Basophils, Absolute 05/01/2022 0.28 (A) 0.00 - 0.20 10*3/mm3 Final   • Immature Grans, Absolute 05/01/2022 0.07 (A) 0.00 - 0.05 10*3/mm3 Final   • nRBC 05/01/2022 0.0  0.0 - 0.2 /100 WBC Final   • RBC, UA 05/01/2022 None Seen  None Seen /HPF Final   • WBC, UA 05/01/2022 None Seen  None Seen /HPF Final   • Bacteria, UA 05/01/2022 Trace (A) None Seen /HPF Final   • Squamous Epithelial Cells, UA 05/01/2022 0-2  None Seen, 0-2 /HPF Final   • Hyaline Casts, UA 05/01/2022 None Seen  None Seen /LPF Final   • Sperm, UA 05/01/2022 Moderate/2+ (A) None Seen /HPF Final   • Mucus, UA 05/01/2022 Small/1+ (A) None Seen, Trace /HPF Final   • Methodology  05/01/2022 Manual Light Microscopy   Final        Condition on Discharge:  improved    Vital Signs  Temp:  [97.9 °F (36.6 °C)-98 °F (36.7 °C)] 97.9 °F (36.6 °C)  Heart Rate:  [78-91] 78  Resp:  [18] 18  BP: (122-128)/(78-86) 122/78      Discharge Disposition:  Home or Self Care    Discharge Medications:     Discharge Medications      New Medications      Instructions Start Date   escitalopram 10 MG tablet  Commonly known as: LEXAPRO   10 mg, Oral, Nightly      risperiDONE 0.5 MG tablet  Commonly known as: risperDAL   0.5 mg, Oral, Every 12 Hours Scheduled             Discharge Diet:    Diet Instructions    REGULAR DIET           Activity at Discharge:    Activity Instructions    AS TOLERATED           Follow-up Appointments      [address], [place] [address]  [phone]     May 10 2022     Time spent in discharge: < 30 min    Clinician:   Alejandro Coppola MD  05/10/22  09:27 EDT    Electronically signed by Alejandro Coppola MD at 05/10/22 1032

## 2022-05-10 NOTE — DISCHARGE SUMMARY
":  1975  MRN:  [id number]  Visit Number:  [id number]      Date of Admission:2022   Date of Discharge:  5/10/2022    Discharge Diagnosis:  Active Problems:    Suicidal ideation        Admission Diagnosis:  Suicidal ideation [R45.851]     HPI  Gera Stern is a 47 y.o. male who was admitted on 2022 and evaluated on 2022  with complaints of suicidal.  For details please see H&P dated 22.    Hospital Course  Patient is a 47 y.o. male presented with suicidal ideations. The patient was admitted to the [facility] AE1 unit for safety, further evaluation and treatment.  The patient was started on escitalopram and risperidone was added. He wanted to be started back on Thorazine as he reported it helped with anxiety in the past but his QTc was slightly prolonged and Thorazine was not started.   The patient was also able to take part in individual and group counseling sessions and work on appropriate coping skills. The patient wanted help with getting on disability. He wanted it for his back problem. He was encouraged to abstain from illicit substances and establish care in an outpatient setting and then pursue the disability.   The patient reported feeling hopeless and suicidal for the most part and there appeared to be an element of exaggeration of symptoms and when he decided to leave he had rapid improvement in his mood and he expressed feeling more positive and hopeful about future. Sleep and appetite were improved.  The day of discharge the patient was calm, cooperative and pleasant. Mood was reported to be good, and denied SI/HI/AVH. Also reported no medication side effects.        Mental Status Exam upon discharge:   Mood \"good\"   Affect-congruent, appropriate, stable  Thought Content-goal directed, no delusional material present  Thought process-linear, organized.  Suicidality: No SI  Homicidality: No HI  Perception: No AH/VH    Procedures Performed         Consults:   Consults     No " orders found from 4/3/2022 to 5/3/2022.          Pertinent Test Results:   Admission on 05/02/2022   Component Date Value Ref Range Status   • QT Interval 05/02/2022 412  ms Final   • QTC Interval 05/02/2022 457  ms Final   • WBC 05/03/2022 11.14 (A) 3.40 - 10.80 10*3/mm3 Final   • RBC 05/03/2022 4.67  4.14 - 5.80 10*6/mm3 Final   • Hemoglobin 05/03/2022 14.2  13.0 - 17.7 g/dL Final   • Hematocrit 05/03/2022 43.6  37.5 - 51.0 % Final   • MCV 05/03/2022 93.4  79.0 - 97.0 fL Final   • MCH 05/03/2022 30.4  26.6 - 33.0 pg Final   • MCHC 05/03/2022 32.6  31.5 - 35.7 g/dL Final   • RDW 05/03/2022 14.3  12.3 - 15.4 % Final   • RDW-SD 05/03/2022 49.1  37.0 - 54.0 fl Final   • MPV 05/03/2022 10.1  6.0 - 12.0 fL Final   • Platelets 05/03/2022 592 (A) 140 - 450 10*3/mm3 Final   Admission on 05/01/2022, Discharged on 05/02/2022   Component Date Value Ref Range Status   • Extra Tube 05/01/2022 Hold for add-ons.   Final    Auto resulted.   • Extra Tube 05/01/2022 hold for add-on   Final    Auto resulted   • Extra Tube 05/01/2022 Hold for add-ons.   Final    Auto resulted.   • Extra Tube 05/01/2022 hold for add-on   Final    Auto resulted   • Glucose 05/01/2022 99  65 - 99 mg/dL Final   • BUN 05/01/2022 23 (A) 6 - 20 mg/dL Final   • Creatinine 05/01/2022 1.08  0.76 - 1.27 mg/dL Final   • Sodium 05/01/2022 138  136 - 145 mmol/L Final   • Potassium 05/01/2022 4.1  3.5 - 5.2 mmol/L Final   • Chloride 05/01/2022 100  98 - 107 mmol/L Final   • CO2 05/01/2022 22.7  22.0 - 29.0 mmol/L Final   • Calcium 05/01/2022 9.9  8.6 - 10.5 mg/dL Final   • Total Protein 05/01/2022 8.3  6.0 - 8.5 g/dL Final   • Albumin 05/01/2022 4.70  3.50 - 5.20 g/dL Final   • ALT (SGPT) 05/01/2022 38  1 - 41 U/L Final   • AST (SGOT) 05/01/2022 28  1 - 40 U/L Final   • Alkaline Phosphatase 05/01/2022 107  39 - 117 U/L Final   • Total Bilirubin 05/01/2022 0.7  0.0 - 1.2 mg/dL Final   • Globulin 05/01/2022 3.6  gm/dL Final   • A/G Ratio 05/01/2022 1.3  g/dL Final   •  BUN/Creatinine Ratio 05/01/2022 21.3  7.0 - 25.0 Final   • Anion Gap 05/01/2022 15.3 (A) 5.0 - 15.0 mmol/L Final   • eGFR 05/01/2022 85.2  >60.0 mL/min/1.73 Final    National Kidney Foundation and American Society of Nephrology (ASN) Task Force recommended calculation based on the Chronic Kidney Disease Epidemiology Collaboration (CKD-EPI) equation refit without adjustment for race.   • COVID19 05/01/2022 Not Detected  Not Detected - Ref. Range Final   • Color, UA 05/01/2022 Yellow  Yellow, Straw Final   • Appearance, UA 05/01/2022 Clear  Clear Final   • pH, UA 05/01/2022 <=5.0  5.0 - 8.0 Final   • Specific Gravity, UA 05/01/2022 >=1.030  1.005 - 1.030 Final   • Glucose, UA 05/01/2022 Negative  Negative Final   • Ketones, UA 05/01/2022 40 mg/dL (2+) (A) Negative Final   • Bilirubin, UA 05/01/2022 Negative  Negative Final   • Blood, UA 05/01/2022 Negative  Negative Final   • Protein, UA 05/01/2022 30 mg/dL (1+) (A) Negative Final   • Leuk Esterase, UA 05/01/2022 Negative  Negative Final   • Nitrite, UA 05/01/2022 Negative  Negative Final   • Urobilinogen, UA 05/01/2022 0.2 E.U./dL  0.2 - 1.0 E.U./dL Final   • Acetaminophen 05/01/2022 <5.0  0.0 - 30.0 mcg/mL Final   • Ethanol 05/01/2022 <10  0 - 10 mg/dL Final   • Ethanol % 05/01/2022 <0.010  % Final   • THC, Screen, Urine 05/01/2022 Positive (A) Negative Final   • Phencyclidine (PCP), Urine 05/01/2022 Negative  Negative Final   • Cocaine Screen, Urine 05/01/2022 Negative  Negative Final   • Methamphetamine, Ur 05/01/2022 Positive (A) Negative Final   • Opiate Screen 05/01/2022 Positive (A) Negative Final   • Amphetamine Screen, Urine 05/01/2022 Positive (A) Negative Final   • Benzodiazepine Screen, Urine 05/01/2022 Negative  Negative Final   • Tricyclic Antidepressants Screen 05/01/2022 Negative  Negative Final   • Methadone Screen, Urine 05/01/2022 Negative  Negative Final   • Barbiturates Screen, Urine 05/01/2022 Negative  Negative Final   • Oxycodone Screen, Urine  05/01/2022 Negative  Negative Final   • Propoxyphene Screen 05/01/2022 Negative  Negative Final   • Buprenorphine, Screen, Urine 05/01/2022 Negative  Negative Final   • Salicylate 05/01/2022 <0.3  <=30.0 mg/dL Final   • Magnesium 05/01/2022 2.3  1.6 - 2.6 mg/dL Final   • WBC 05/01/2022 18.17 (A) 3.40 - 10.80 10*3/mm3 Final   • RBC 05/01/2022 4.88  4.14 - 5.80 10*6/mm3 Final   • Hemoglobin 05/01/2022 15.4  13.0 - 17.7 g/dL Final   • Hematocrit 05/01/2022 44.3  37.5 - 51.0 % Final   • MCV 05/01/2022 90.8  79.0 - 97.0 fL Final   • MCH 05/01/2022 31.6  26.6 - 33.0 pg Final   • MCHC 05/01/2022 34.8  31.5 - 35.7 g/dL Final   • RDW 05/01/2022 14.3  12.3 - 15.4 % Final   • RDW-SD 05/01/2022 47.6  37.0 - 54.0 fl Final   • MPV 05/01/2022 9.4  6.0 - 12.0 fL Final   • Platelets 05/01/2022 617 (A) 140 - 450 10*3/mm3 Final   • Neutrophil % 05/01/2022 72.5  42.7 - 76.0 % Final   • Lymphocyte % 05/01/2022 15.6 (A) 19.6 - 45.3 % Final   • Monocyte % 05/01/2022 9.8  5.0 - 12.0 % Final   • Eosinophil % 05/01/2022 0.2 (A) 0.3 - 6.2 % Final   • Basophil % 05/01/2022 1.5  0.0 - 1.5 % Final   • Immature Grans % 05/01/2022 0.4  0.0 - 0.5 % Final   • Neutrophils, Absolute 05/01/2022 13.18 (A) 1.70 - 7.00 10*3/mm3 Final   • Lymphocytes, Absolute 05/01/2022 2.83  0.70 - 3.10 10*3/mm3 Final   • Monocytes, Absolute 05/01/2022 1.78 (A) 0.10 - 0.90 10*3/mm3 Final   • Eosinophils, Absolute 05/01/2022 0.03  0.00 - 0.40 10*3/mm3 Final   • Basophils, Absolute 05/01/2022 0.28 (A) 0.00 - 0.20 10*3/mm3 Final   • Immature Grans, Absolute 05/01/2022 0.07 (A) 0.00 - 0.05 10*3/mm3 Final   • nRBC 05/01/2022 0.0  0.0 - 0.2 /100 WBC Final   • RBC, UA 05/01/2022 None Seen  None Seen /HPF Final   • WBC, UA 05/01/2022 None Seen  None Seen /HPF Final   • Bacteria, UA 05/01/2022 Trace (A) None Seen /HPF Final   • Squamous Epithelial Cells, UA 05/01/2022 0-2  None Seen, 0-2 /HPF Final   • Hyaline Casts, UA 05/01/2022 None Seen  None Seen /LPF Final   • Sperm, UA  05/01/2022 Moderate/2+ (A) None Seen /HPF Final   • Mucus, UA 05/01/2022 Small/1+ (A) None Seen, Trace /HPF Final   • Methodology 05/01/2022 Manual Light Microscopy   Final        Condition on Discharge:  improved    Vital Signs  Temp:  [97.9 °F (36.6 °C)-98 °F (36.7 °C)] 97.9 °F (36.6 °C)  Heart Rate:  [78-91] 78  Resp:  [18] 18  BP: (122-128)/(78-86) 122/78      Discharge Disposition:  Home or Self Care    Discharge Medications:     Discharge Medications      New Medications      Instructions Start Date   escitalopram 10 MG tablet  Commonly known as: LEXAPRO   10 mg, Oral, Nightly      risperiDONE 0.5 MG tablet  Commonly known as: risperDAL   0.5 mg, Oral, Every 12 Hours Scheduled             Discharge Diet:    Diet Instructions    REGULAR DIET           Activity at Discharge:    Activity Instructions    AS TOLERATED           Follow-up Appointments      [address], [place] [address]  [phone]     May 10 2022     Time spent in discharge: < 30 min    Clinician:   Alejandro Coppola MD  05/10/22  09:27 EDT

## 2022-05-10 NOTE — PLAN OF CARE
Goal Outcome Evaluation:  Plan of Care Reviewed With: patient  Patient Agreement with Plan of Care: agrees     Progress: improving   Patient reports anxiety and depression both 10 on a 1/10 scale. Reports being worried about being released and does not feel comfortable leaving. Denies SI/HI/AVH. Reports eating and sleeping well. Patient had snack and went to sleep. Patient slept well through night with no awakenings.

## 2022-05-10 NOTE — NURSING NOTE
PATIENT IS ANGRY AND BEING VERBALLY ABUSIVE, CURSING STAFF BECAUSE HE CANNOT  HAVE ALL OF HIS BELONGINGS AT THIS TIME.

## 2022-05-10 NOTE — PROGRESS NOTES
Discharge Planning Assessment  [facility]     Patient Name: Gera Stern  MRN: [id number]  Today's Date: 5/10/2022    Admit Date: 5/2/2022    Patient is being discharged to [facility] today. Patient is agreeable to this plan. Patient denies SI, HI, and AVH today[facility]. No other needs identified.         RANDELL Teran

## 2023-01-03 ENCOUNTER — HOSPITAL ENCOUNTER (EMERGENCY)
Facility: HOSPITAL | Age: 48
Discharge: LEFT BEFORE DISCHARGE | End: 2023-01-04
Attending: EMERGENCY MEDICINE | Admitting: EMERGENCY MEDICINE
Payer: MEDICAID

## 2023-01-03 VITALS
WEIGHT: 150 LBS | RESPIRATION RATE: 20 BRPM | BODY MASS INDEX: 22.73 KG/M2 | DIASTOLIC BLOOD PRESSURE: 96 MMHG | TEMPERATURE: 98.5 F | HEART RATE: 91 BPM | OXYGEN SATURATION: 98 % | HEIGHT: 68 IN | SYSTOLIC BLOOD PRESSURE: 140 MMHG

## 2023-01-03 PROCEDURE — 99283 EMERGENCY DEPT VISIT LOW MDM: CPT

## 2023-01-04 NOTE — ED PROVIDER NOTES
Patient presented for concerns that he wanted to go on a killing spree because he was tired of people making fun of him.  Police were contacted on arrival because the patient was making threats that he with going to rampage if he was put back out in the lobby.  Patient was taken into police custody almost immediately and I was not directly involved in patient care.  Although I did not physically examine the patient he is awake and alert, no outward signs of intoxication, reviewed his vital signs were which were hemodynamically stable.  Due to concern for staff decision was made by charge nurse to involve police soon as possible as the patient had duffel bags with him, given his reported threats.     Devonte Dumas, DO  01/04/23 0037

## 2023-01-04 NOTE — ED NOTES
Pt made several threatening comments during triage. [employer] called and transported pt to [facility] for EMD.

## 2023-01-18 ENCOUNTER — OFFICE VISIT (OUTPATIENT)
Dept: FAMILY MEDICINE CLINIC | Facility: OTHER | Age: 48
End: 2023-01-18
Payer: MEDICAID

## 2023-01-18 VITALS
SYSTOLIC BLOOD PRESSURE: 118 MMHG | DIASTOLIC BLOOD PRESSURE: 82 MMHG | TEMPERATURE: 97.7 F | HEART RATE: 114 BPM | BODY MASS INDEX: 22.73 KG/M2 | WEIGHT: 150 LBS | OXYGEN SATURATION: 95 % | HEIGHT: 68 IN | RESPIRATION RATE: 16 BRPM

## 2023-01-18 DIAGNOSIS — F33.0 MILD EPISODE OF RECURRENT MAJOR DEPRESSIVE DISORDER: ICD-10-CM

## 2023-01-18 DIAGNOSIS — F11.11 HISTORY OF OPIOID ABUSE: ICD-10-CM

## 2023-01-18 DIAGNOSIS — G89.29 CHRONIC BILATERAL LOW BACK PAIN, UNSPECIFIED WHETHER SCIATICA PRESENT: Primary | ICD-10-CM

## 2023-01-18 DIAGNOSIS — M54.50 CHRONIC BILATERAL LOW BACK PAIN, UNSPECIFIED WHETHER SCIATICA PRESENT: Primary | ICD-10-CM

## 2023-01-18 PROCEDURE — 99203 OFFICE O/P NEW LOW 30 MIN: CPT | Performed by: NURSE PRACTITIONER

## 2023-01-18 NOTE — PROGRESS NOTES
New Patient History and Physical      Referring Physician: No ref. provider found    Chief Complaint:    Chief Complaint   Patient presents with   • Back Pain     Multiple back surgeries       History of Present Illness:   Gera Stern is a 48 y.o. male who presents today as a new patient. Patient presents today for complaints of chronic back pain. Patient carries backpacks daily and causes severe back pain. Patient has history of spondylosis, bulging discs in lumbar spine. Patient refused to have surgery years ago due to risks involved. Patient has no PCP.     Patient also concerned regarding depression. Patient states that he has been taking Lexapro for 2 weeks but does not see a difference yet. States that he does not understand why he cannot get Xanax, states he took them years ago and that it helped him with anxiety and depression. Has spent time at [facility] in [place], [place] recently.     Patient has history of opioid abuse and suboxone use. Patient has been getting suboxone from [facility] in [place], [place] but has not had them in 4-5 months due to relocation.    Subjective     Review of Systems   Constitutional: Negative for fatigue and fever.   Musculoskeletal: Positive for back pain.   Psychiatric/Behavioral: Positive for agitation. Negative for decreased concentration and sleep disturbance. The patient is nervous/anxious.    All other systems reviewed and are negative.       The following portions of the patient's history were reviewed and updated as appropriate: allergies, current medications, past family history, past medical history, past social history, past surgical history and problem list.    Past Medical History:   Past Medical History:   Diagnosis Date   • Injury of back        Past Surgical History:  Past Surgical History:   Procedure Laterality Date   • ANKLE SURGERY Bilateral    • BACK SURGERY     • SPLENECTOMY         Family History: family history is not on file.    Social  "History:  reports that he has been smoking cigarettes. He has been smoking an average of .5 packs per day. His smokeless tobacco use includes snuff and chew. He reports that he does not currently use drugs after having used the following drugs: Methamphetamines. He reports that he does not drink alcohol.    Medications:    Current Outpatient Medications:   •  escitalopram (LEXAPRO) 10 MG tablet, Take 1 tablet by mouth Every Night. Indications: Major Depressive Disorder, Disp: 30 tablet, Rfl: 0  •  risperiDONE (risperDAL) 0.5 MG tablet, Take 1 tablet by mouth Every 12 (Twelve) Hours. Indications: Major Depressive Disorder, Disp: 60 tablet, Rfl: 0  •  diclofenac (VOLTAREN) 50 MG EC tablet, Take 1 tablet by mouth 3 (Three) Times a Day As Needed (back pain)., Disp: 60 tablet, Rfl: 0  •  Elastic Bandages & Supports ([employer] Lumbar/Back Support Brace) misc, 1 each Daily., Disp: 1 each, Rfl: 0    Allergies:  Allergies   Allergen Reactions   • Amoxicillin Swelling and Unknown (See Comments)   • Bactrim [Sulfamethoxazole-Trimethoprim] Swelling   • Codeine Nausea Only and Other (See Comments)     makes patient sick to his stomach  makes patient sick to his stomach  makes patient sick to his stomach       Objective     Vital Signs:   /82   Pulse 114   Temp 97.7 °F (36.5 °C)   Resp 16   Ht 172.7 cm (68\")   Wt 68 kg (150 lb)   SpO2 95%   BMI 22.81 kg/m²      Physical Exam:  Physical Exam  Vitals and nursing note reviewed.   HENT:      Head: Normocephalic.      Nose: Nose normal.      Mouth/Throat:      Lips: Pink.   Eyes:      General: Lids are normal.      Conjunctiva/sclera: Conjunctivae normal.      Pupils: Pupils are equal, round, and reactive to light.   Cardiovascular:      Rate and Rhythm: Normal rate.      Heart sounds: Normal heart sounds.   Pulmonary:      Effort: Pulmonary effort is normal.      Breath sounds: Normal breath sounds.   Abdominal:      General: Bowel sounds are normal.   Musculoskeletal:         " General: Normal range of motion.   Skin:     General: Skin is warm and dry.   Neurological:      Mental Status: He is alert and oriented to person, place, and time.      Gait: Gait is intact.   Psychiatric:         Attention and Perception: Attention normal.         Mood and Affect: Mood and affect normal.         Speech: Speech normal.         Behavior: Behavior normal. Behavior is cooperative.         Assessment / Plan     Assessment/Plan:   Diagnoses and all orders for this visit:    1. Chronic bilateral low back pain, unspecified whether sciatica present (Primary)  -     Elastic Bandages & Supports ([employer] Lumbar/Back Support Brace) misc; 1 each Daily.  Dispense: 1 each; Refill: 0  -     diclofenac (VOLTAREN) 50 MG EC tablet; Take 1 tablet by mouth 3 (Three) Times a Day As Needed (back pain).  Dispense: 60 tablet; Refill: 0  -     Ambulatory Referral to Pain Management    2. Mild episode of recurrent major depressive disorder (HCC)    --patient has appointment with Clarissa on 2/1/2023    Discussion Summary:  Discussed plan of care in detail with pt today; pt verb understanding and agrees.    Follow up:  Return if symptoms worsen or fail to improve.     Patient Education:  There are no Patient Instructions on file for this visit.    JUAN CARLOS Rai  01/18/23  13:00 EST    Please note that portions of this note may have been completed with a voice recognition program.

## 2023-02-01 ENCOUNTER — OFFICE VISIT (OUTPATIENT)
Dept: FAMILY MEDICINE CLINIC | Facility: OTHER | Age: 48
End: 2023-02-01
Payer: MEDICAID

## 2023-02-01 VITALS
DIASTOLIC BLOOD PRESSURE: 82 MMHG | SYSTOLIC BLOOD PRESSURE: 128 MMHG | TEMPERATURE: 97.3 F | HEART RATE: 94 BPM | HEIGHT: 68 IN | WEIGHT: 150 LBS | OXYGEN SATURATION: 99 % | RESPIRATION RATE: 16 BRPM | BODY MASS INDEX: 22.73 KG/M2

## 2023-02-01 DIAGNOSIS — G89.29 CHRONIC BILATERAL LOW BACK PAIN, UNSPECIFIED WHETHER SCIATICA PRESENT: ICD-10-CM

## 2023-02-01 DIAGNOSIS — K02.9 DENTAL CARIES: ICD-10-CM

## 2023-02-01 DIAGNOSIS — K08.9 POOR DENTITION: Primary | ICD-10-CM

## 2023-02-01 DIAGNOSIS — M54.50 CHRONIC BILATERAL LOW BACK PAIN, UNSPECIFIED WHETHER SCIATICA PRESENT: ICD-10-CM

## 2023-02-01 PROCEDURE — 99213 OFFICE O/P EST LOW 20 MIN: CPT | Performed by: NURSE PRACTITIONER

## 2023-02-01 RX ORDER — IBUPROFEN 600 MG/1
600 TABLET ORAL EVERY 6 HOURS PRN
Qty: 20 TABLET | Refills: 0 | Status: SHIPPED | OUTPATIENT
Start: 2023-02-01

## 2023-02-01 RX ORDER — CEPHALEXIN 500 MG/1
500 CAPSULE ORAL 2 TIMES DAILY
Qty: 14 CAPSULE | Refills: 0 | Status: SHIPPED | OUTPATIENT
Start: 2023-02-01 | End: 2023-02-08

## 2023-02-01 NOTE — PROGRESS NOTES
"                      Established Patient        Chief Complaint:   Chief Complaint   Patient presents with   • Dental Pain         History of Present Illness:    Gera Stern is a 48 y.o. male who presents today with tooth pain and decay. States he's \"going to [place] to have them taken care of.\" Reports his [employer] is going to help with transportation.      Patient also concerned that he did not get his \"back brace\" that was ordered at last visit. Patient states that pharmacy did not have it available.     Subjective     The following portions of the patient's history were reviewed and updated as appropriate: allergies, current medications, past family history, past medical history, past social history, past surgical history and problem list.    ALLERGIES  Allergies   Allergen Reactions   • Amoxicillin Swelling and Unknown (See Comments)   • Bactrim [Sulfamethoxazole-Trimethoprim] Swelling   • Codeine Nausea Only and Other (See Comments)     makes patient sick to his stomach  makes patient sick to his stomach  makes patient sick to his stomach       ROS  Review of Systems   Constitutional: Negative.    HENT: Positive for dental problem.    Eyes: Negative.    Respiratory: Negative.    Cardiovascular: Negative.    Gastrointestinal: Negative.    Endocrine: Negative.    Genitourinary: Negative.    Musculoskeletal: Negative.    Skin: Negative.    Allergic/Immunologic: Negative.    Neurological: Negative.    Hematological: Negative.    Psychiatric/Behavioral: Negative.        Objective     Vital Signs:   /82   Pulse 94   Temp 97.3 °F (36.3 °C)   Resp 16   Ht 172.7 cm (68\")   Wt 68 kg (150 lb)   SpO2 99%   BMI 22.81 kg/m²     Physical Exam   Physical Exam  Vitals and nursing note reviewed.   Constitutional:       Appearance: Normal appearance.   HENT:      Head: Normocephalic.      Mouth/Throat:      Lips: Pink.      Mouth: No oral lesions.      Dentition: Abnormal dentition. Dental tenderness, gingival " swelling and dental caries present.   Eyes:      General: Lids are normal.   Cardiovascular:      Rate and Rhythm: Normal rate.      Heart sounds: Normal heart sounds.   Pulmonary:      Effort: Pulmonary effort is normal.      Breath sounds: Normal breath sounds.   Abdominal:      General: Bowel sounds are normal.   Musculoskeletal:         General: Normal range of motion.   Neurological:      Mental Status: He is alert and oriented to person, place, and time.      Gait: Gait is intact.   Psychiatric:         Attention and Perception: Attention normal.         Mood and Affect: Mood and affect normal.         Speech: Speech normal.         Behavior: Behavior normal. Behavior is cooperative.         Assessment and Plan      Assessment/Plan:   Diagnoses and all orders for this visit:    1. Poor dentition (Primary)  -     benzocaine (ORAJEL) 10 % mucosal gel; Apply  to the mouth or throat As Needed for Mucositis.  Dispense: 2 each; Refill: 0  -     cephalexin (Keflex) 500 MG capsule; Take 1 capsule by mouth 2 (Two) Times a Day for 7 days. Patient states he's been able to tolerate Keflex and has taken it before  Dispense: 14 capsule; Refill: 0  -     ibuprofen (ADVIL,MOTRIN) 600 MG tablet; Take 1 tablet by mouth Every 6 (Six) Hours As Needed for Mild Pain.  Dispense: 20 tablet; Refill: 0    2. Chronic bilateral low back pain, unspecified whether sciatica present  -     ibuprofen (ADVIL,MOTRIN) 600 MG tablet; Take 1 tablet by mouth Every 6 (Six) Hours As Needed for Mild Pain.  Dispense: 20 tablet; Refill: 0  -     Elastic Bandages & Supports ([employer] Lumbar/Back Support Brace) misc; 1 each Daily.  Dispense: 1 each; Refill: 0    3. Dental caries  -     benzocaine (ORAJEL) 10 % mucosal gel; Apply  to the mouth or throat As Needed for Mucositis.  Dispense: 2 each; Refill: 0        Discussion Summary:  Discussed plan of care in detail with pt today; pt verb understanding and agrees.    Follow up:  Return if symptoms worsen or  fail to improve.     Patient Education:  There are no Patient Instructions on file for this visit.    JUAN CARLOS Rai  02/01/23  14:39 EST          Please note that portions of this note may have been completed with a voice recognition program.

## 2023-06-17 ENCOUNTER — HOSPITAL ENCOUNTER (EMERGENCY)
Facility: HOSPITAL | Age: 48
Discharge: HOME | End: 2023-06-17
Attending: EMERGENCY MEDICINE
Payer: MEDICAID

## 2023-06-17 ENCOUNTER — APPOINTMENT (OUTPATIENT)
Dept: CT IMAGING | Facility: HOSPITAL | Age: 48
End: 2023-06-17
Payer: MEDICAID

## 2023-06-17 VITALS
OXYGEN SATURATION: 100 % | RESPIRATION RATE: 20 BRPM | DIASTOLIC BLOOD PRESSURE: 99 MMHG | TEMPERATURE: 97.7 F | HEIGHT: 68 IN | SYSTOLIC BLOOD PRESSURE: 123 MMHG | BODY MASS INDEX: 19.7 KG/M2 | WEIGHT: 130 LBS | HEART RATE: 66 BPM

## 2023-06-17 DIAGNOSIS — R10.84 GENERALIZED ABDOMINAL PAIN: ICD-10-CM

## 2023-06-17 DIAGNOSIS — R19.7 DIARRHEA, UNSPECIFIED TYPE: Primary | ICD-10-CM

## 2023-06-17 DIAGNOSIS — K08.9 POOR DENTITION: ICD-10-CM

## 2023-06-17 DIAGNOSIS — K02.9 DENTAL CARIES: ICD-10-CM

## 2023-06-17 LAB
ALBUMIN SERPL-MCNC: 3.8 G/DL (ref 3.5–5.2)
ALBUMIN/GLOB SERPL: 1 G/DL
ALP SERPL-CCNC: 139 U/L (ref 39–117)
ALT SERPL W P-5'-P-CCNC: 51 U/L (ref 1–41)
ANION GAP SERPL CALCULATED.3IONS-SCNC: 13 MMOL/L (ref 5–15)
AST SERPL-CCNC: 81 U/L (ref 1–40)
BASOPHILS # BLD AUTO: 0.18 10*3/MM3 (ref 0–0.2)
BASOPHILS NFR BLD AUTO: 1.1 % (ref 0–1.5)
BILIRUB SERPL-MCNC: 0.3 MG/DL (ref 0–1.2)
BILIRUB UR QL STRIP: NEGATIVE
BUN SERPL-MCNC: 13 MG/DL (ref 6–20)
BUN/CREAT SERPL: 12 (ref 7–25)
CALCIUM SPEC-SCNC: 9.8 MG/DL (ref 8.6–10.5)
CHLORIDE SERPL-SCNC: 102 MMOL/L (ref 98–107)
CLARITY UR: CLEAR
CO2 SERPL-SCNC: 22 MMOL/L (ref 22–29)
COLOR UR: YELLOW
CREAT SERPL-MCNC: 1.08 MG/DL (ref 0.76–1.27)
DEPRECATED RDW RBC AUTO: 42.9 FL (ref 37–54)
EGFRCR SERPLBLD CKD-EPI 2021: 84.6 ML/MIN/1.73
EOSINOPHIL # BLD AUTO: 0.25 10*3/MM3 (ref 0–0.4)
EOSINOPHIL NFR BLD AUTO: 1.6 % (ref 0.3–6.2)
ERYTHROCYTE [DISTWIDTH] IN BLOOD BY AUTOMATED COUNT: 13.9 % (ref 12.3–15.4)
GLOBULIN UR ELPH-MCNC: 3.9 GM/DL
GLUCOSE SERPL-MCNC: 109 MG/DL (ref 65–99)
GLUCOSE UR STRIP-MCNC: NEGATIVE MG/DL
HCT VFR BLD AUTO: 51.4 % (ref 37.5–51)
HGB BLD-MCNC: 17.6 G/DL (ref 13–17.7)
HGB UR QL STRIP.AUTO: NEGATIVE
HOLD SPECIMEN: NORMAL
HOLD SPECIMEN: NORMAL
IMM GRANULOCYTES # BLD AUTO: 0.04 10*3/MM3 (ref 0–0.05)
IMM GRANULOCYTES NFR BLD AUTO: 0.2 % (ref 0–0.5)
KETONES UR QL STRIP: NEGATIVE
LEUKOCYTE ESTERASE UR QL STRIP.AUTO: NEGATIVE
LIPASE SERPL-CCNC: 31 U/L (ref 13–60)
LYMPHOCYTES # BLD AUTO: 2.96 10*3/MM3 (ref 0.7–3.1)
LYMPHOCYTES NFR BLD AUTO: 18.4 % (ref 19.6–45.3)
MCH RBC QN AUTO: 29.2 PG (ref 26.6–33)
MCHC RBC AUTO-ENTMCNC: 34.2 G/DL (ref 31.5–35.7)
MCV RBC AUTO: 85.2 FL (ref 79–97)
MONOCYTES # BLD AUTO: 1.42 10*3/MM3 (ref 0.1–0.9)
MONOCYTES NFR BLD AUTO: 8.8 % (ref 5–12)
NEUTROPHILS NFR BLD AUTO: 11.21 10*3/MM3 (ref 1.7–7)
NEUTROPHILS NFR BLD AUTO: 69.9 % (ref 42.7–76)
NITRITE UR QL STRIP: NEGATIVE
NRBC BLD AUTO-RTO: 0 /100 WBC (ref 0–0.2)
PH UR STRIP.AUTO: 5.5 [PH] (ref 5–8)
PLATELET # BLD AUTO: 594 10*3/MM3 (ref 140–450)
PMV BLD AUTO: 9.2 FL (ref 6–12)
POTASSIUM SERPL-SCNC: 3.5 MMOL/L (ref 3.5–5.2)
PROT SERPL-MCNC: 7.7 G/DL (ref 6–8.5)
PROT UR QL STRIP: ABNORMAL
RBC # BLD AUTO: 6.03 10*6/MM3 (ref 4.14–5.8)
SODIUM SERPL-SCNC: 137 MMOL/L (ref 136–145)
SP GR UR STRIP: >=1.03 (ref 1–1.03)
UROBILINOGEN UR QL STRIP: ABNORMAL
WBC NRBC COR # BLD: 16.06 10*3/MM3 (ref 3.4–10.8)
WHOLE BLOOD HOLD COAG: NORMAL
WHOLE BLOOD HOLD SPECIMEN: NORMAL

## 2023-06-17 PROCEDURE — 25510000001 IOPAMIDOL 61 % SOLUTION: Performed by: EMERGENCY MEDICINE

## 2023-06-17 PROCEDURE — 85025 COMPLETE CBC W/AUTO DIFF WBC: CPT | Performed by: EMERGENCY MEDICINE

## 2023-06-17 PROCEDURE — 83690 ASSAY OF LIPASE: CPT | Performed by: EMERGENCY MEDICINE

## 2023-06-17 PROCEDURE — 81003 URINALYSIS AUTO W/O SCOPE: CPT | Performed by: EMERGENCY MEDICINE

## 2023-06-17 PROCEDURE — 80053 COMPREHEN METABOLIC PANEL: CPT | Performed by: EMERGENCY MEDICINE

## 2023-06-17 PROCEDURE — 25010000002 KETOROLAC TROMETHAMINE PER 15 MG: Performed by: EMERGENCY MEDICINE

## 2023-06-17 PROCEDURE — 74177 CT ABD & PELVIS W/CONTRAST: CPT

## 2023-06-17 RX ORDER — DICYCLOMINE HYDROCHLORIDE 10 MG/1
20 CAPSULE ORAL ONCE
Status: COMPLETED | OUTPATIENT
Start: 2023-06-17 | End: 2023-06-17

## 2023-06-17 RX ORDER — KETOROLAC TROMETHAMINE 30 MG/ML
30 INJECTION, SOLUTION INTRAMUSCULAR; INTRAVENOUS ONCE
Status: COMPLETED | OUTPATIENT
Start: 2023-06-17 | End: 2023-06-17

## 2023-06-17 RX ORDER — SODIUM CHLORIDE 0.9 % (FLUSH) 0.9 %
10 SYRINGE (ML) INJECTION AS NEEDED
Status: DISCONTINUED | OUTPATIENT
Start: 2023-06-17 | End: 2023-06-17 | Stop reason: HOSPADM

## 2023-06-17 RX ORDER — KETOROLAC TROMETHAMINE 10 MG/1
10 TABLET, FILM COATED ORAL EVERY 6 HOURS PRN
Qty: 20 TABLET | Refills: 0 | Status: SHIPPED | OUTPATIENT
Start: 2023-06-17

## 2023-06-17 RX ORDER — DICYCLOMINE HCL 20 MG
20 TABLET ORAL EVERY 6 HOURS
Qty: 20 TABLET | Refills: 0 | Status: SHIPPED | OUTPATIENT
Start: 2023-06-17

## 2023-06-17 RX ADMIN — SODIUM CHLORIDE 1000 ML: 9 INJECTION, SOLUTION INTRAVENOUS at 04:21

## 2023-06-17 RX ADMIN — KETOROLAC TROMETHAMINE 30 MG: 30 INJECTION, SOLUTION INTRAMUSCULAR; INTRAVENOUS at 04:19

## 2023-06-17 RX ADMIN — IOPAMIDOL 70 ML: 612 INJECTION, SOLUTION INTRAVENOUS at 04:44

## 2023-06-17 RX ADMIN — DICYCLOMINE HYDROCHLORIDE 20 MG: 10 CAPSULE ORAL at 04:20

## 2023-06-17 NOTE — ED PROVIDER NOTES
"Subjective  History of Present Illness:    Chief Complaint: Diarrhea abdominal pain    History of Present Illness: 48-year-old male with yellowish diarrhea with associated diffuse abdominal pain, diarrhea x4 days, abdominal pain the last 24 hours or so.    Nurses Notes reviewed and agree, including vitals, allergies, social history and prior medical history.     REVIEW OF SYSTEMS: All systems reviewed and not pertinent unless noted.  Review of Systems      Positive for: Diarrhea and abdominal pain    Negative for: Fever GI bleeding    Past Medical History:   Diagnosis Date   • Injury of back        Allergies:    Amoxicillin, Bactrim [sulfamethoxazole-trimethoprim], and Codeine      Past Surgical History:   Procedure Laterality Date   • ANKLE SURGERY Bilateral    • BACK SURGERY     • SPLENECTOMY           Social History     Socioeconomic History   • Marital status: Single   Tobacco Use   • Smoking status: Every Day     Packs/day: 0.50     Years: 10.00     Pack years: 5.00     Types: Cigarettes   • Smokeless tobacco: Current     Types: Snuff, Chew   Vaping Use   • Vaping Use: Never used   Substance and Sexual Activity   • Alcohol use: Never   • Drug use: Not Currently     Types: Methamphetamines     Comment: quit x 3wks   • Sexual activity: Defer         History reviewed. No pertinent family history.    Objective  Physical Exam:  /99 (BP Location: Left arm, Patient Position: Lying)   Pulse 66   Temp 97.7 °F (36.5 °C) (Oral)   Resp 20   Ht 172.7 cm (68\")   Wt 59 kg (130 lb)   SpO2 100%   BMI 19.77 kg/m²      Physical Exam    CONSTITUTIONAL: Well developed, nontoxic 48-year-old male,  in no acute distress.  VITAL SIGNS: per nursing, reviewed and noted  SKIN: exposed skin with well-healed vertical oriented surgical abdominal scar  EYES: Grossly EOMI, no icterus  ENT: Normal voice.  Moist mucous membranes   RESPIRATORY:  No increased work of breathing. No retractions.   CARDIOVASCULAR:   Extremities pink and " warm.  Good cap refill to extremities.   GI: Abdomen without distention.  Soft, diffuse nonfocal tenderness  MUSCULOSKELETAL: Age-appropriate bulk and tone, moves all 4 extremities  NEUROLOGIC: Alert, oriented x 3. No gross deficits. GCS 15.   PSYCH: appropriate affect.  : no bladder tenderness or distention, no CVA tenderness    Procedures    ED Course:    Lab Results (last 24 hours)     Procedure Component Value Units Date/Time    CBC & Differential [[id number]]  (Abnormal) Collected: 06/17/23 0356    Specimen: Blood Updated: 06/17/23 0402    Narrative:      The following orders were created for panel order CBC & Differential.  Procedure                               Abnormality         Status                     ---------                               -----------         ------                     CBC Auto Differential[[id number]]        Abnormal            Final result                 Please view results for these tests on the individual orders.    Comprehensive Metabolic Panel [[id number]]  (Abnormal) Collected: 06/17/23 0356    Specimen: Blood Updated: 06/17/23 0441     Glucose 109 mg/dL      BUN 13 mg/dL      Creatinine 1.08 mg/dL      Sodium 137 mmol/L      Potassium 3.5 mmol/L      Chloride 102 mmol/L      CO2 22.0 mmol/L      Calcium 9.8 mg/dL      Total Protein 7.7 g/dL      Albumin 3.8 g/dL      ALT (SGPT) 51 U/L      AST (SGOT) 81 U/L      Alkaline Phosphatase 139 U/L      Total Bilirubin 0.3 mg/dL      Globulin 3.9 gm/dL      A/G Ratio 1.0 g/dL      BUN/Creatinine Ratio 12.0     Anion Gap 13.0 mmol/L      eGFR 84.6 mL/min/1.73     Narrative:      GFR Normal >60  Chronic Kidney Disease <60  Kidney Failure <15      Lipase [[id number]]  (Normal) Collected: 06/17/23 0356    Specimen: Blood Updated: 06/17/23 0441     Lipase 31 U/L     CBC Auto Differential [[id number]]  (Abnormal) Collected: 06/17/23 0356    Specimen: Blood Updated: 06/17/23 0402     WBC 16.06 10*3/mm3      RBC 6.03 10*6/mm3      Hemoglobin  17.6 g/dL      Hematocrit 51.4 %      MCV 85.2 fL      MCH 29.2 pg      MCHC 34.2 g/dL      RDW 13.9 %      RDW-SD 42.9 fl      MPV 9.2 fL      Platelets 594 10*3/mm3      Neutrophil % 69.9 %      Lymphocyte % 18.4 %      Monocyte % 8.8 %      Eosinophil % 1.6 %      Basophil % 1.1 %      Immature Grans % 0.2 %      Neutrophils, Absolute 11.21 10*3/mm3      Lymphocytes, Absolute 2.96 10*3/mm3      Monocytes, Absolute 1.42 10*3/mm3      Eosinophils, Absolute 0.25 10*3/mm3      Basophils, Absolute 0.18 10*3/mm3      Immature Grans, Absolute 0.04 10*3/mm3      nRBC 0.0 /100 WBC     Urinalysis With Microscopic If Indicated (No Culture) - Urine, Clean Catch [[id number]]  (Abnormal) Collected: 06/17/23 0519    Specimen: Urine, Clean Catch Updated: 06/17/23 0527     Color, UA Yellow     Appearance, UA Clear     pH, UA 5.5     Specific Gravity, UA >=1.030     Glucose, UA Negative     Ketones, UA Negative     Bilirubin, UA Negative     Blood, UA Negative     Protein, UA Trace     Leuk Esterase, UA Negative     Nitrite, UA Negative     Urobilinogen, UA 0.2 E.U./dL    Narrative:      Urine microscopic not indicated.           CT Abdomen Pelvis With Contrast    Result Date: 6/17/2023  FINAL REPORT TECHNIQUE: null CLINICAL HISTORY: diffuse abdominal pain, diarrhea, remote splenectomy COMPARISON: null FINDINGS: CT abdomen and pelvis with contrast Comparison: None Findings: No consolidation or effusion. Small right inferior renal cyst. Splenectomy. Small left upper quadrant splenules. Gallbladder and solid organs otherwise unremarkable. No urolithiasis. The stomach is distended and contains food stuffs. This could be due to gastroparesis. The colon is moderately distended and contains liquid stool. There is no colonic wall thickening. Mesenteric vessels are patent. Normal appendix. No ascites or hernia. The bones are intact.     Impression: IMPRESSION: 1. Distended stomach with food stuffs. Potential gastroparesis. 2. Liquid stool  in a moderately distended colon. This could be due to colonic ileus. There is no evidence of obstruction or inflammatory change. 3. Additional findings as above. Authenticated and Electronically Signed by Ranjana Moulton MD on 06/17/2023 05:42:56 AM       [facility]     Amount and/or Complexity of Data Reviewed  Clinical lab tests: reviewed             Medical Decision Making:    Initial impression of presenting illness: 48-year-old male presents with diarrhea and abdominal pain    DDX: includes but is not limited to: Infectious diarrhea, colitis diverticulitis, among others         Patient arrives normotensive afebrile no tachycardia oxygen saturations 100% with vitals interpreted by myself.     Pertinent features from physical exam: Abdomen soft, no distention, no tympany, diffuse nonfocal mild tenderness.    Initial diagnostic plan: Abdominal labs including CBC CMP lipase UA, CT abdomen pelvis    Results from initial plan were reviewed and interpreted by me revealing CT abdomen pelvis without acute findings per radiologist.  CBC CMP lipase UA and without acute findings other than mild transaminitis and a nonobstructive pattern.  White count 16,000.  No UTI no pancreatitis.    Diagnostic information from other sources: None    Interventions / Re-evaluation: IV fluids Toradol Bentyl with resolution of symptoms feeling much better.    Results/clinical rationale were discussed with patient    Consultations/Discussion of results with other physicians: None    Disposition plan: Patient was discharged in home stable condition.  Counseled on supportive care, outpatient follow-up. Return precaution discussed.  Patient/family was understanding and agreeable with plan prescription Bentyl and Toradol.    -----      Final diagnoses:   Diarrhea, unspecified type   Generalized abdominal pain            Devonte Dumas,   06/17/23 1944